# Patient Record
Sex: FEMALE | Race: ASIAN | NOT HISPANIC OR LATINO
[De-identification: names, ages, dates, MRNs, and addresses within clinical notes are randomized per-mention and may not be internally consistent; named-entity substitution may affect disease eponyms.]

---

## 2017-01-10 ENCOUNTER — APPOINTMENT (OUTPATIENT)
Dept: PSYCHIATRY | Facility: CLINIC | Age: 57
End: 2017-01-10

## 2017-02-14 ENCOUNTER — RX RENEWAL (OUTPATIENT)
Age: 57
End: 2017-02-14

## 2017-04-28 ENCOUNTER — APPOINTMENT (OUTPATIENT)
Dept: PSYCHIATRY | Facility: CLINIC | Age: 57
End: 2017-04-28

## 2017-07-06 ENCOUNTER — APPOINTMENT (OUTPATIENT)
Dept: PSYCHIATRY | Facility: CLINIC | Age: 57
End: 2017-07-06

## 2017-11-08 ENCOUNTER — APPOINTMENT (OUTPATIENT)
Dept: PSYCHIATRY | Facility: CLINIC | Age: 57
End: 2017-11-08
Payer: COMMERCIAL

## 2017-11-08 PROCEDURE — 99214 OFFICE O/P EST MOD 30 MIN: CPT

## 2017-11-28 ENCOUNTER — EMERGENCY (EMERGENCY)
Facility: HOSPITAL | Age: 57
LOS: 1 days | Discharge: HOME | End: 2017-11-28
Attending: EMERGENCY MEDICINE | Admitting: EMERGENCY MEDICINE
Payer: COMMERCIAL

## 2017-11-28 VITALS
DIASTOLIC BLOOD PRESSURE: 84 MMHG | OXYGEN SATURATION: 98 % | RESPIRATION RATE: 18 BRPM | HEART RATE: 74 BPM | TEMPERATURE: 98 F | SYSTOLIC BLOOD PRESSURE: 157 MMHG

## 2017-11-28 DIAGNOSIS — Z98.89 OTHER SPECIFIED POSTPROCEDURAL STATES: Chronic | ICD-10-CM

## 2017-11-28 PROCEDURE — 93010 ELECTROCARDIOGRAM REPORT: CPT

## 2017-11-28 PROCEDURE — 99285 EMERGENCY DEPT VISIT HI MDM: CPT | Mod: 25

## 2017-11-28 NOTE — ED ADULT NURSE NOTE - CHPI ED SYMPTOMS NEG
no chills/no diaphoresis/no vomiting/no nausea/no syncope/no back pain/no shortness of breath/no fever

## 2017-11-28 NOTE — ED ADULT NURSE NOTE - OBJECTIVE STATEMENT
57 year old female came into the ER via ambulation with c/o palpitations for 3 days.  Pt states she also had an episode of slurred speech at work and has history of TIA in the past. 57 year old female came into the ER via ambulation with c/o palpitations and cough for 3 days.  Pt states she also had an episode of slurred speech at work and has history of TIA in the past. Gross neuro intact, PERRL. No c/o dizziness, SOB, N/V/D, fever or chills. Family at bedside, comfort and safety maintained.

## 2017-11-28 NOTE — ED ADULT TRIAGE NOTE - CHIEF COMPLAINT QUOTE
pt reports palpitations x multiples days. cough/weakness. "I feel like somethings not right in my body"

## 2017-11-29 VITALS
HEART RATE: 77 BPM | RESPIRATION RATE: 18 BRPM | DIASTOLIC BLOOD PRESSURE: 64 MMHG | SYSTOLIC BLOOD PRESSURE: 99 MMHG | TEMPERATURE: 98 F | OXYGEN SATURATION: 98 %

## 2017-11-29 LAB
ALBUMIN SERPL ELPH-MCNC: 4.6 G/DL — SIGNIFICANT CHANGE UP (ref 3.3–5)
ALP SERPL-CCNC: 47 U/L — SIGNIFICANT CHANGE UP (ref 40–120)
ALT FLD-CCNC: 21 U/L RC — SIGNIFICANT CHANGE UP (ref 10–45)
ANION GAP SERPL CALC-SCNC: 13 MMOL/L — SIGNIFICANT CHANGE UP (ref 5–17)
APPEARANCE UR: CLEAR — SIGNIFICANT CHANGE UP
AST SERPL-CCNC: 20 U/L — SIGNIFICANT CHANGE UP (ref 10–40)
BASOPHILS # BLD AUTO: 0 K/UL — SIGNIFICANT CHANGE UP (ref 0–0.2)
BASOPHILS NFR BLD AUTO: 0.1 % — SIGNIFICANT CHANGE UP (ref 0–2)
BILIRUB SERPL-MCNC: 0.4 MG/DL — SIGNIFICANT CHANGE UP (ref 0.2–1.2)
BILIRUB UR-MCNC: NEGATIVE — SIGNIFICANT CHANGE UP
BUN SERPL-MCNC: 16 MG/DL — SIGNIFICANT CHANGE UP (ref 7–23)
CALCIUM SERPL-MCNC: 9.3 MG/DL — SIGNIFICANT CHANGE UP (ref 8.4–10.5)
CHLORIDE SERPL-SCNC: 103 MMOL/L — SIGNIFICANT CHANGE UP (ref 96–108)
CO2 SERPL-SCNC: 27 MMOL/L — SIGNIFICANT CHANGE UP (ref 22–31)
COLOR SPEC: COLORLESS — SIGNIFICANT CHANGE UP
CREAT SERPL-MCNC: 0.87 MG/DL — SIGNIFICANT CHANGE UP (ref 0.5–1.3)
DIFF PNL FLD: NEGATIVE — SIGNIFICANT CHANGE UP
EOSINOPHIL # BLD AUTO: 0.2 K/UL — SIGNIFICANT CHANGE UP (ref 0–0.5)
EOSINOPHIL NFR BLD AUTO: 3 % — SIGNIFICANT CHANGE UP (ref 0–6)
EPI CELLS # UR: SIGNIFICANT CHANGE UP /HPF
GLUCOSE SERPL-MCNC: 151 MG/DL — HIGH (ref 70–99)
GLUCOSE UR QL: NEGATIVE — SIGNIFICANT CHANGE UP
HCT VFR BLD CALC: 31.6 % — LOW (ref 34.5–45)
HGB BLD-MCNC: 10.4 G/DL — LOW (ref 11.5–15.5)
KETONES UR-MCNC: NEGATIVE — SIGNIFICANT CHANGE UP
LEUKOCYTE ESTERASE UR-ACNC: ABNORMAL
LYMPHOCYTES # BLD AUTO: 2.4 K/UL — SIGNIFICANT CHANGE UP (ref 1–3.3)
LYMPHOCYTES # BLD AUTO: 36.8 % — SIGNIFICANT CHANGE UP (ref 13–44)
MCHC RBC-ENTMCNC: 20.9 PG — LOW (ref 27–34)
MCHC RBC-ENTMCNC: 32.9 GM/DL — SIGNIFICANT CHANGE UP (ref 32–36)
MCV RBC AUTO: 63.4 FL — LOW (ref 80–100)
MONOCYTES # BLD AUTO: 0.4 K/UL — SIGNIFICANT CHANGE UP (ref 0–0.9)
MONOCYTES NFR BLD AUTO: 6.2 % — SIGNIFICANT CHANGE UP (ref 2–14)
NEUTROPHILS # BLD AUTO: 3.5 K/UL — SIGNIFICANT CHANGE UP (ref 1.8–7.4)
NEUTROPHILS NFR BLD AUTO: 54 % — SIGNIFICANT CHANGE UP (ref 43–77)
NITRITE UR-MCNC: NEGATIVE — SIGNIFICANT CHANGE UP
PH UR: 6.5 — SIGNIFICANT CHANGE UP (ref 5–8)
PLATELET # BLD AUTO: 206 K/UL — SIGNIFICANT CHANGE UP (ref 150–400)
POTASSIUM SERPL-MCNC: 3.6 MMOL/L — SIGNIFICANT CHANGE UP (ref 3.5–5.3)
POTASSIUM SERPL-SCNC: 3.6 MMOL/L — SIGNIFICANT CHANGE UP (ref 3.5–5.3)
PROT SERPL-MCNC: 7.4 G/DL — SIGNIFICANT CHANGE UP (ref 6–8.3)
PROT UR-MCNC: NEGATIVE — SIGNIFICANT CHANGE UP
RBC # BLD: 4.98 M/UL — SIGNIFICANT CHANGE UP (ref 3.8–5.2)
RBC # FLD: 14.7 % — HIGH (ref 10.3–14.5)
RBC CASTS # UR COMP ASSIST: SIGNIFICANT CHANGE UP /HPF (ref 0–2)
SODIUM SERPL-SCNC: 143 MMOL/L — SIGNIFICANT CHANGE UP (ref 135–145)
SP GR SPEC: 1 — LOW (ref 1.01–1.02)
TSH SERPL-MCNC: 0.73 UIU/ML — SIGNIFICANT CHANGE UP (ref 0.27–4.2)
UROBILINOGEN FLD QL: NEGATIVE — SIGNIFICANT CHANGE UP
WBC # BLD: 6.5 K/UL — SIGNIFICANT CHANGE UP (ref 3.8–10.5)
WBC # FLD AUTO: 6.5 K/UL — SIGNIFICANT CHANGE UP (ref 3.8–10.5)
WBC UR QL: SIGNIFICANT CHANGE UP /HPF (ref 0–5)

## 2017-11-29 PROCEDURE — 71020: CPT | Mod: 26

## 2017-11-29 PROCEDURE — 84443 ASSAY THYROID STIM HORMONE: CPT

## 2017-11-29 PROCEDURE — 81001 URINALYSIS AUTO W/SCOPE: CPT

## 2017-11-29 PROCEDURE — 93005 ELECTROCARDIOGRAM TRACING: CPT

## 2017-11-29 PROCEDURE — 80053 COMPREHEN METABOLIC PANEL: CPT

## 2017-11-29 PROCEDURE — 84484 ASSAY OF TROPONIN QUANT: CPT

## 2017-11-29 PROCEDURE — 71046 X-RAY EXAM CHEST 2 VIEWS: CPT

## 2017-11-29 PROCEDURE — 99283 EMERGENCY DEPT VISIT LOW MDM: CPT | Mod: 25

## 2017-11-29 PROCEDURE — 85027 COMPLETE CBC AUTOMATED: CPT

## 2017-11-29 NOTE — ED PROVIDER NOTE - MEDICAL DECISION MAKING DETAILS
Geovanna Stewart MD - Attending Physician: Pt with multiple medical complaints, acute on chronic, followed by her DrSallie Here with palpitations, chest pain, ?slurred speech earlier that is resolved. Labs, Xr, obs

## 2017-11-29 NOTE — ED PROVIDER NOTE - PROGRESS NOTE DETAILS
Pt with normal labs, normal CXR, EKG unchanged from prior. Atypical story, subacute in onset, not concerned for ACS. Pt needs to f/u with pcp and her psychiatrist (pt reports whom thinks her chronic symptoms are related to anxiety/depression). Discussed return precautions

## 2017-11-29 NOTE — ED PROVIDER NOTE - OBJECTIVE STATEMENT
Pt reports palpitations x weeks. Intermittent, reports current symptoms. She notes sometimes gets chest pain, various places, today had pain to mid chest. Not associated with SOB, nausea, vomiting or dizziness. She reports she called her doctor and they increased her Lexapro today, but she still did not feel well so came here. She reports feeling like "my gait is changed" but is unable to clarify what she means by this. She says at work today she had an episode of slurred speech that lasted a second and resolved. Unsure if anyone witnessed it. Denies fevers.     Takes Synthroid, Lexapro, and Risperdol

## 2017-12-06 ENCOUNTER — APPOINTMENT (OUTPATIENT)
Dept: PSYCHIATRY | Facility: CLINIC | Age: 57
End: 2017-12-06
Payer: COMMERCIAL

## 2017-12-06 PROCEDURE — 99214 OFFICE O/P EST MOD 30 MIN: CPT

## 2018-01-08 ENCOUNTER — APPOINTMENT (OUTPATIENT)
Dept: PSYCHIATRY | Facility: CLINIC | Age: 58
End: 2018-01-08
Payer: COMMERCIAL

## 2018-01-08 PROCEDURE — 99214 OFFICE O/P EST MOD 30 MIN: CPT

## 2018-01-22 ENCOUNTER — RESULT REVIEW (OUTPATIENT)
Age: 58
End: 2018-01-22

## 2018-04-16 ENCOUNTER — APPOINTMENT (OUTPATIENT)
Dept: PSYCHIATRY | Facility: CLINIC | Age: 58
End: 2018-04-16
Payer: COMMERCIAL

## 2018-04-16 PROCEDURE — 99214 OFFICE O/P EST MOD 30 MIN: CPT

## 2018-07-10 ENCOUNTER — RX RENEWAL (OUTPATIENT)
Age: 58
End: 2018-07-10

## 2018-08-07 ENCOUNTER — APPOINTMENT (OUTPATIENT)
Dept: PSYCHIATRY | Facility: CLINIC | Age: 58
End: 2018-08-07
Payer: COMMERCIAL

## 2018-08-07 PROCEDURE — 99214 OFFICE O/P EST MOD 30 MIN: CPT

## 2018-11-12 ENCOUNTER — APPOINTMENT (OUTPATIENT)
Dept: PSYCHIATRY | Facility: CLINIC | Age: 58
End: 2018-11-12
Payer: COMMERCIAL

## 2018-11-12 PROCEDURE — 99214 OFFICE O/P EST MOD 30 MIN: CPT

## 2019-02-12 ENCOUNTER — APPOINTMENT (OUTPATIENT)
Dept: PSYCHIATRY | Facility: CLINIC | Age: 59
End: 2019-02-12
Payer: COMMERCIAL

## 2019-02-12 PROCEDURE — 99214 OFFICE O/P EST MOD 30 MIN: CPT

## 2019-05-17 ENCOUNTER — APPOINTMENT (OUTPATIENT)
Dept: PSYCHIATRY | Facility: CLINIC | Age: 59
End: 2019-05-17

## 2019-05-17 ENCOUNTER — RX RENEWAL (OUTPATIENT)
Age: 59
End: 2019-05-17

## 2019-06-10 ENCOUNTER — APPOINTMENT (OUTPATIENT)
Dept: PSYCHIATRY | Facility: CLINIC | Age: 59
End: 2019-06-10
Payer: COMMERCIAL

## 2019-06-10 PROCEDURE — 99214 OFFICE O/P EST MOD 30 MIN: CPT

## 2019-07-29 ENCOUNTER — APPOINTMENT (OUTPATIENT)
Dept: PSYCHIATRY | Facility: CLINIC | Age: 59
End: 2019-07-29
Payer: COMMERCIAL

## 2019-07-29 PROCEDURE — 99214 OFFICE O/P EST MOD 30 MIN: CPT

## 2019-09-10 ENCOUNTER — APPOINTMENT (OUTPATIENT)
Dept: PSYCHIATRY | Facility: CLINIC | Age: 59
End: 2019-09-10
Payer: COMMERCIAL

## 2019-09-10 PROCEDURE — 99214 OFFICE O/P EST MOD 30 MIN: CPT

## 2019-11-19 ENCOUNTER — APPOINTMENT (OUTPATIENT)
Dept: PSYCHIATRY | Facility: CLINIC | Age: 59
End: 2019-11-19
Payer: COMMERCIAL

## 2019-11-19 PROCEDURE — 99214 OFFICE O/P EST MOD 30 MIN: CPT

## 2019-11-19 RX ORDER — MULTIVIT-MIN/FOLIC/VIT K/LYCOP 400-300MCG
50 MCG TABLET ORAL
Refills: 0 | Status: ACTIVE | COMMUNITY
Start: 2019-11-19

## 2020-02-28 ENCOUNTER — APPOINTMENT (OUTPATIENT)
Dept: PSYCHIATRY | Facility: CLINIC | Age: 60
End: 2020-02-28
Payer: COMMERCIAL

## 2020-02-28 PROCEDURE — 99214 OFFICE O/P EST MOD 30 MIN: CPT

## 2020-05-12 ENCOUNTER — APPOINTMENT (OUTPATIENT)
Dept: PSYCHIATRY | Facility: CLINIC | Age: 60
End: 2020-05-12

## 2020-06-01 ENCOUNTER — APPOINTMENT (OUTPATIENT)
Dept: PSYCHIATRY | Facility: CLINIC | Age: 60
End: 2020-06-01
Payer: COMMERCIAL

## 2020-06-01 PROCEDURE — 99214 OFFICE O/P EST MOD 30 MIN: CPT

## 2020-09-21 ENCOUNTER — APPOINTMENT (OUTPATIENT)
Dept: PSYCHIATRY | Facility: CLINIC | Age: 60
End: 2020-09-21
Payer: COMMERCIAL

## 2020-09-21 PROCEDURE — 99214 OFFICE O/P EST MOD 30 MIN: CPT

## 2021-02-25 ENCOUNTER — EMERGENCY (EMERGENCY)
Facility: HOSPITAL | Age: 61
LOS: 1 days | Discharge: HOME | End: 2021-02-25
Attending: STUDENT IN AN ORGANIZED HEALTH CARE EDUCATION/TRAINING PROGRAM
Payer: MEDICARE

## 2021-02-25 VITALS
SYSTOLIC BLOOD PRESSURE: 138 MMHG | WEIGHT: 169.98 LBS | HEART RATE: 74 BPM | OXYGEN SATURATION: 98 % | DIASTOLIC BLOOD PRESSURE: 64 MMHG | TEMPERATURE: 98 F | RESPIRATION RATE: 20 BRPM

## 2021-02-25 VITALS
OXYGEN SATURATION: 99 % | DIASTOLIC BLOOD PRESSURE: 67 MMHG | SYSTOLIC BLOOD PRESSURE: 106 MMHG | TEMPERATURE: 98 F | HEART RATE: 67 BPM | RESPIRATION RATE: 16 BRPM

## 2021-02-25 DIAGNOSIS — Z98.89 OTHER SPECIFIED POSTPROCEDURAL STATES: Chronic | ICD-10-CM

## 2021-02-25 LAB
ALBUMIN SERPL ELPH-MCNC: 4 G/DL — SIGNIFICANT CHANGE UP (ref 3.3–5)
ALP SERPL-CCNC: 60 U/L — SIGNIFICANT CHANGE UP (ref 40–120)
ALT FLD-CCNC: 16 U/L — SIGNIFICANT CHANGE UP (ref 10–45)
ANION GAP SERPL CALC-SCNC: 13 MMOL/L — SIGNIFICANT CHANGE UP (ref 5–17)
APPEARANCE UR: CLEAR — SIGNIFICANT CHANGE UP
AST SERPL-CCNC: 20 U/L — SIGNIFICANT CHANGE UP (ref 10–40)
BACTERIA # UR AUTO: NEGATIVE — SIGNIFICANT CHANGE UP
BASOPHILS # BLD AUTO: 0 K/UL — SIGNIFICANT CHANGE UP (ref 0–0.2)
BASOPHILS NFR BLD AUTO: 0 % — SIGNIFICANT CHANGE UP (ref 0–2)
BILIRUB SERPL-MCNC: 0.2 MG/DL — SIGNIFICANT CHANGE UP (ref 0.2–1.2)
BILIRUB UR-MCNC: NEGATIVE — SIGNIFICANT CHANGE UP
BUN SERPL-MCNC: 13 MG/DL — SIGNIFICANT CHANGE UP (ref 7–23)
CALCIUM SERPL-MCNC: 9.8 MG/DL — SIGNIFICANT CHANGE UP (ref 8.4–10.5)
CHLORIDE SERPL-SCNC: 99 MMOL/L — SIGNIFICANT CHANGE UP (ref 96–108)
CK SERPL-CCNC: 74 U/L — SIGNIFICANT CHANGE UP (ref 25–170)
CO2 SERPL-SCNC: 25 MMOL/L — SIGNIFICANT CHANGE UP (ref 22–31)
COLOR SPEC: SIGNIFICANT CHANGE UP
CREAT SERPL-MCNC: 0.7 MG/DL — SIGNIFICANT CHANGE UP (ref 0.5–1.3)
CRP SERPL-MCNC: 11.2 MG/DL — HIGH (ref 0–0.4)
DIFF PNL FLD: NEGATIVE — SIGNIFICANT CHANGE UP
EOSINOPHIL # BLD AUTO: 1.48 K/UL — HIGH (ref 0–0.5)
EOSINOPHIL NFR BLD AUTO: 13.9 % — HIGH (ref 0–6)
EPI CELLS # UR: 1 /HPF — SIGNIFICANT CHANGE UP
ERYTHROCYTE [SEDIMENTATION RATE] IN BLOOD: 109 MM/HR — HIGH (ref 0–20)
GLUCOSE SERPL-MCNC: 101 MG/DL — HIGH (ref 70–99)
GLUCOSE UR QL: NEGATIVE — SIGNIFICANT CHANGE UP
HCT VFR BLD CALC: 29.6 % — LOW (ref 34.5–45)
HGB BLD-MCNC: 9.3 G/DL — LOW (ref 11.5–15.5)
HYALINE CASTS # UR AUTO: 0 /LPF — SIGNIFICANT CHANGE UP (ref 0–2)
KETONES UR-MCNC: NEGATIVE — SIGNIFICANT CHANGE UP
LEUKOCYTE ESTERASE UR-ACNC: ABNORMAL
LYMPHOCYTES # BLD AUTO: 1.76 K/UL — SIGNIFICANT CHANGE UP (ref 1–3.3)
LYMPHOCYTES # BLD AUTO: 16.5 % — SIGNIFICANT CHANGE UP (ref 13–44)
MCHC RBC-ENTMCNC: 19.3 PG — LOW (ref 27–34)
MCHC RBC-ENTMCNC: 31.4 GM/DL — LOW (ref 32–36)
MCV RBC AUTO: 61.4 FL — LOW (ref 80–100)
MONOCYTES # BLD AUTO: 0.28 K/UL — SIGNIFICANT CHANGE UP (ref 0–0.9)
MONOCYTES NFR BLD AUTO: 2.6 % — SIGNIFICANT CHANGE UP (ref 2–14)
NEUTROPHILS # BLD AUTO: 7.15 K/UL — SIGNIFICANT CHANGE UP (ref 1.8–7.4)
NEUTROPHILS NFR BLD AUTO: 67 % — SIGNIFICANT CHANGE UP (ref 43–77)
NITRITE UR-MCNC: NEGATIVE — SIGNIFICANT CHANGE UP
PH UR: 7 — SIGNIFICANT CHANGE UP (ref 5–8)
PLATELET # BLD AUTO: 601 K/UL — HIGH (ref 150–400)
POTASSIUM SERPL-MCNC: 3.5 MMOL/L — SIGNIFICANT CHANGE UP (ref 3.5–5.3)
POTASSIUM SERPL-SCNC: 3.5 MMOL/L — SIGNIFICANT CHANGE UP (ref 3.5–5.3)
PROCALCITONIN SERPL-MCNC: 0.04 NG/ML — SIGNIFICANT CHANGE UP (ref 0.02–0.1)
PROT SERPL-MCNC: 7.5 G/DL — SIGNIFICANT CHANGE UP (ref 6–8.3)
PROT UR-MCNC: NEGATIVE — SIGNIFICANT CHANGE UP
RBC # BLD: 4.82 M/UL — SIGNIFICANT CHANGE UP (ref 3.8–5.2)
RBC # FLD: 15.6 % — HIGH (ref 10.3–14.5)
RBC CASTS # UR COMP ASSIST: 1 /HPF — SIGNIFICANT CHANGE UP (ref 0–4)
RHEUMATOID FACT SERPL-ACNC: 11 IU/ML — SIGNIFICANT CHANGE UP (ref 0–13)
SARS-COV-2 RNA SPEC QL NAA+PROBE: SIGNIFICANT CHANGE UP
SODIUM SERPL-SCNC: 137 MMOL/L — SIGNIFICANT CHANGE UP (ref 135–145)
SP GR SPEC: 1.01 — LOW (ref 1.01–1.02)
UROBILINOGEN FLD QL: NEGATIVE — SIGNIFICANT CHANGE UP
WBC # BLD: 10.67 K/UL — HIGH (ref 3.8–10.5)
WBC # FLD AUTO: 10.67 K/UL — HIGH (ref 3.8–10.5)
WBC UR QL: 7 /HPF — HIGH (ref 0–5)

## 2021-02-25 PROCEDURE — 86162 COMPLEMENT TOTAL (CH50): CPT

## 2021-02-25 PROCEDURE — 99285 EMERGENCY DEPT VISIT HI MDM: CPT

## 2021-02-25 PROCEDURE — 71045 X-RAY EXAM CHEST 1 VIEW: CPT | Mod: 26

## 2021-02-25 PROCEDURE — 80053 COMPREHEN METABOLIC PANEL: CPT

## 2021-02-25 PROCEDURE — 86160 COMPLEMENT ANTIGEN: CPT

## 2021-02-25 PROCEDURE — 99284 EMERGENCY DEPT VISIT MOD MDM: CPT | Mod: 25

## 2021-02-25 PROCEDURE — 93970 EXTREMITY STUDY: CPT

## 2021-02-25 PROCEDURE — 71045 X-RAY EXAM CHEST 1 VIEW: CPT

## 2021-02-25 PROCEDURE — 85652 RBC SED RATE AUTOMATED: CPT

## 2021-02-25 PROCEDURE — 84145 PROCALCITONIN (PCT): CPT

## 2021-02-25 PROCEDURE — 83520 IMMUNOASSAY QUANT NOS NONAB: CPT

## 2021-02-25 PROCEDURE — 87040 BLOOD CULTURE FOR BACTERIA: CPT

## 2021-02-25 PROCEDURE — 85025 COMPLETE CBC W/AUTO DIFF WBC: CPT

## 2021-02-25 PROCEDURE — 86431 RHEUMATOID FACTOR QUANT: CPT

## 2021-02-25 PROCEDURE — 82550 ASSAY OF CK (CPK): CPT

## 2021-02-25 PROCEDURE — 86769 SARS-COV-2 COVID-19 ANTIBODY: CPT

## 2021-02-25 PROCEDURE — 86140 C-REACTIVE PROTEIN: CPT

## 2021-02-25 PROCEDURE — 87086 URINE CULTURE/COLONY COUNT: CPT

## 2021-02-25 PROCEDURE — U0003: CPT

## 2021-02-25 PROCEDURE — U0005: CPT

## 2021-02-25 PROCEDURE — 93970 EXTREMITY STUDY: CPT | Mod: 26

## 2021-02-25 PROCEDURE — 81001 URINALYSIS AUTO W/SCOPE: CPT

## 2021-02-25 PROCEDURE — 93005 ELECTROCARDIOGRAM TRACING: CPT

## 2021-02-25 RX ORDER — IBUPROFEN 200 MG
600 TABLET ORAL ONCE
Refills: 0 | Status: COMPLETED | OUTPATIENT
Start: 2021-02-25 | End: 2021-02-25

## 2021-02-25 RX ADMIN — Medication 600 MILLIGRAM(S): at 18:18

## 2021-02-25 RX ADMIN — Medication 600 MILLIGRAM(S): at 11:43

## 2021-02-25 NOTE — ED PROVIDER NOTE - NS ED ROS FT
Gen: + fevers, decreased appetite   Eyes: No eye irritation or discharge  ENT: No ear pain, congestion, sore throat  Resp: No cough or trouble breathing  Cardiovascular: No chest pain or palpitation  Gastroenteric: No nausea/vomiting, diarrhea, constipation  :  No change in urine output; no dysuria  MS: + diffuse jt swelling, myalgias   Skin: No rashes  Neuro: No headache; no abnormal movements  Remainder negative, except as per the HPI

## 2021-02-25 NOTE — ED PROVIDER NOTE - PHYSICAL EXAMINATION
A&Ox3, NAD. NCAT. PERRL, EOMI. Neck supple, no LAD. Lungs CTAB. +S1S2, RRR, No m/r/g. Abd soft, NT/ND, +BS, no rebound or guarding. Extremities: cap refill <2, pulses in distal extremities 4+, no edema. Skin without rash. CN II-XII intact. Strength 5/5 UE/LE. no joint ttp throughout hands, elbows, knees, shoulders. hands appear swollen, no peripheral edema BL. Sensations intact throughout. Gait steady.

## 2021-02-25 NOTE — ED ADULT TRIAGE NOTE - CHIEF COMPLAINT QUOTE
s/p COVID vaccine 3 wks ago. Symptoms started fever, hand swelling, leg pain, difficulty walking. 101.4 last night. Took tylenol at 6AM today

## 2021-02-25 NOTE — ED PROVIDER NOTE - CARE PLAN
Principal Discharge DX:	Myalgia   Principal Discharge DX:	Myalgia  Secondary Diagnosis:	Fever of unknown origin  Secondary Diagnosis:	Joint swelling  Secondary Diagnosis:	Leg swelling

## 2021-02-25 NOTE — ED PROVIDER NOTE - PROGRESS NOTE DETAILS
spoke with rheum attending Dr. Graham, recommended ibuprofen for myalgia and will see her within a week. -Ember Max PA-C

## 2021-02-25 NOTE — ED PROVIDER NOTE - NSFOLLOWUPINSTRUCTIONS_ED_ALL_ED_FT
- stay hydrated.   - take tylenol 975mg and ibuprofen 600mg every 6 hours as needed for pain-take with meals.  - follow up with your pcp in 1-2 days.  - follow up with Dr. Graham this week, call number attached to make an appointment  - return if symptoms worsen, weakness, numbness/tingling, blurred vision, difficulty ambulating and all other concerns.

## 2021-02-25 NOTE — ED PROVIDER NOTE - CARE PROVIDER_API CALL
Connie Graham)  Internal Medicine; Rheumatology  [address] [facility] [address] [facility] [address]  [place] [address]  Phone: [phone]  Fax: [phone]  Follow Up Time: 1-3 Days

## 2021-02-25 NOTE — ED ADULT NURSE NOTE - OBJECTIVE STATEMENT
60 year old female patient presents ambulatory to ED c/o joint pain and persistent fevers x 3 weeks s/p second pfizer vaccine. Patient states she has been having daily fevers of 100.4F-101.4F over the past few weeks and is reporting b/l knee, elbow and shoulder pain. Patient states she called her PMD and was told to take tylenol for fevers but has not been able to see his doctor. Patient reports some relief with tyelnol but pain returns shortly after. Patient denies current CP, fevers, SOB, abd pain, n/v/d, urinary symptoms. Patient last took tylenol at 6am, but found to be febrile to 100.5F rectally. MD aware. Patient aware of plan of care.

## 2021-02-25 NOTE — ED PROVIDER NOTE - OBJECTIVE STATEMENT
61 yo female with pmh hypothyroidism, depression, presenting with concern of myalgias, fevers, and weakness x 3 weeks after receiving 2nd Pfizer covid vaccination. Pt states she has had fevers daily Tmax 101.4, myalgia, hand, arm and leg swelling. Denies headaches, nausea, vomiting, abdominal pina, rashes, dysuria, cough, congestion, shortness of breath, difficulty breathing, no recent travel, tick/insect bites.

## 2021-02-25 NOTE — ED PROVIDER NOTE - PATIENT PORTAL LINK FT
You can access the FollowMyHealth Patient Portal offered by [facility] [place] by registering at the following website: [email or web address]. By joining [facility]’s FollowMyHealth portal, you will also be able to view your health information using other applications (apps) compatible with our system.

## 2021-02-25 NOTE — ED PROVIDER NOTE - CLINICAL SUMMARY MEDICAL DECISION MAKING FREE TEXT BOX
Jordan DO: 60F hx hypothyroidism, depression, presenting w/ c/o myalgias, joint swelling, joint pains, and low grade temps 100.4-101F x 3 weeks after receiving her second dose of pfizer vaccine. Denies chest pain, sob, throat pain/ throat swelling, itching, travel, sick contacts, wt loss, night sweats. Does sweat after using antipyretics. Tolerating po. Denies abd pain/ n/v. Never developed a rash. Vitals stable, febrile, very well appearing, heart s1s2 no murmurs, lungs cta b/l, abd soft ntnd, b/l LE symmetrical- nonpitting edema b/l mild, mild joint swelling b/l hands, no erythema of joint, no severe ttp, ranges all joints with normal range of motion. Pt reports she feels 'stiffness' of the joints. Plan: consider immune reaction to vaccine, does not appear to be DRESS, poss serum sickness? but very well appearing, likely can tolerate po steroids if warranted. Jordan DO: 60F hx hypothyroidism, depression, presenting w/ c/o myalgias, joint swelling, joint pains, and low grade temps 100.4-101F x 3 weeks after receiving her second dose of pfizer vaccine. Denies chest pain, sob, throat pain/ throat swelling, itching, travel, sick contacts, wt loss, night sweats. Does sweat after using antipyretics. Tolerating po. Denies abd pain/ n/v. Never developed a rash. Vitals stable, febrile, very well appearing, heart s1s2 no murmurs, lungs cta b/l, abd soft ntnd, b/l LE symmetrical- nonpitting edema b/l mild, mild joint swelling b/l hands, no erythema of joint, no severe ttp, ranges all joints with normal range of motion but Pt reports she feels 'stiffness' of the joints. Plan: polyarthralgia and fevers of unknown source- consider immune reaction to vaccine, does not appear to be DRESS, poss serum sickness? vs stills disease but very well appearing, likely can tolerate po steroids if warranted, will rule out infectious etiology and discuss w/ rheumatology regarding possible outpt follow up and if any medications should be initiated. will send complement factors to facilitate dx. rule out dvt though less likely but pt reports significant b/l LE swelling.

## 2021-02-26 ENCOUNTER — APPOINTMENT (OUTPATIENT)
Dept: RHEUMATOLOGY | Facility: CLINIC | Age: 61
End: 2021-02-26

## 2021-02-26 ENCOUNTER — APPOINTMENT (OUTPATIENT)
Dept: RHEUMATOLOGY | Facility: CLINIC | Age: 61
End: 2021-02-26
Payer: COMMERCIAL

## 2021-02-26 LAB
C3 SERPL-MCNC: 190 MG/DL — HIGH (ref 81–157)
C4 SERPL-MCNC: 17 MG/DL — SIGNIFICANT CHANGE UP (ref 13–39)
CULTURE RESULTS: SIGNIFICANT CHANGE UP
SPECIMEN SOURCE: SIGNIFICANT CHANGE UP

## 2021-02-26 PROCEDURE — 99204 OFFICE O/P NEW MOD 45 MIN: CPT | Mod: 95

## 2021-02-27 ENCOUNTER — NON-APPOINTMENT (OUTPATIENT)
Age: 61
End: 2021-02-27

## 2021-02-27 NOTE — ED POST DISCHARGE NOTE - DETAILS
Pt to follow up with Rheum this week after the Covid vaccine. IL 2, 10, 17 and 6 elevated in setting of recent COVID vaccine. Pt followed up w/ rheum Dr. Graham on 2/26 (note in HIE, results pending at that time), sent call out to Dr. Graham to discuss results, awaiting return call. Pt has another rheum f/u on 3/26 scheduled. - Brandon Martin PA-C

## 2021-02-27 NOTE — ED POST DISCHARGE NOTE - ADDITIONAL DOCUMENTATION
Pt seen in ED for myalgias and fevers n after receiving Covid vaccine. As above pt followed up wit Rheum 2/26 and had additional follow up 3/26 and is being managed as an outpt w/ medications started per documentation. No call back from pt Rheum at this time, contacting pt would not  as she has already initiated a relationship with her Rheumatologist who will be further managing her care. Will clear results.  -Olga Sykes PA-C

## 2021-02-28 LAB
A-TUMOR NECROSIS FACT SERPL-MCNC: 2.3 PG/ML — SIGNIFICANT CHANGE UP
IL10 SERPL-MCNC: 10.5 PG/ML — HIGH
IL12 SERPL-MCNC: <1.9 PG/ML — SIGNIFICANT CHANGE UP
IL13 SERPL-MCNC: <1.7 PG/ML — SIGNIFICANT CHANGE UP
IL17A SERPL-MCNC: 5.6 PG/ML — HIGH
IL2 SERPL-MCNC: 992.6 PG/ML — HIGH (ref 175.3–858.2)
IL2 SERPL-MCNC: <2.1 PG/ML — SIGNIFICANT CHANGE UP
IL4 SERPL-MCNC: <2.2 PG/ML — SIGNIFICANT CHANGE UP
IL6 SERPL-MCNC: 15.5 PG/ML — HIGH
IL8 SERPL-MCNC: <3 PG/ML — SIGNIFICANT CHANGE UP
INTERFERON GAMMA, BLOOD: <4.2 PG/ML — SIGNIFICANT CHANGE UP
INTERLEUKIN 1 BETA: <6.5 PG/ML — SIGNIFICANT CHANGE UP
INTERLEUKIN 5: <2.1 PG/ML — SIGNIFICANT CHANGE UP
SARS-COV-2 IGG SERPL QL IA: POSITIVE
SARS-COV-2 IGM SERPL IA-ACNC: 2.21 INDEX — HIGH

## 2021-03-01 LAB — TOTAL HEM COMP BLD-ACNC: 55 U/ML — SIGNIFICANT CHANGE UP (ref 42–95)

## 2021-03-01 NOTE — HISTORY OF PRESENT ILLNESS
[FreeTextEntry1] : Rosalio used.\par \par = Ms. Kemp is a 60F presenting with fevers, joint and muscle pain that started several hours after she received the 2nd dose of COVID vaccine (Pfizer), which was 3 weeks ago\par = After the first dose she had fevers for 2 days and the symptoms resolved\par = after the second dose she has developed fevers up to T101 with significant swelling in her feet, knees, ankles, shoulders, elbows and hands. Her hands have been swollen and she has had difficulty making a fist. She still gets daily fevers up to 101\par = She was in bed for 5 days due to fevers and malaise. \par = She recovered partially and went back to work but the fevers recurred\par = she has been taking ibuprofen 400mg on occasion and it has been helping a little bit.\par = she went to [facility] ED yesterday 2/25 for the fevers. Had normal CBC, CMP, UA, CXR, Doppler LE. , CRP 10, blood cx x 2 neg, urine culture neg. She was given ibuprofen 600mg which helped and was dc home. Higher doses of ibuprofen give her diarrhea.\par = she has felt a little bit better past 2 days\par = prior to the vaccine she denies a h/o joint pain or swelling\par = no recent sick contacts or travel\par = in 3/2020 she had COVID with symptoms of fevers, cough, but no arthritis\par = ROS: no rash, oral/nasal ulcers, hair loss, appetite is ok, no significant weight loss; no chest pain, GI complaints\par \par FH: No personal or family history of autoimmune disease, psoriasis, IBD.\par Occupation: nursing supervisor\par PMH: hypothyroidism, major depressive disorder with psychotic features

## 2021-03-01 NOTE — ASSESSMENT
[FreeTextEntry1] : 60F with COVID infection in 3/2020 that developed severe inflammatory arthritis and fevers after her 2nd COVID vaccine. 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 complements and cytokine panel pending from hospital\par -meloxicam 7.5mg bid with PPI for now. Prednisone 40mg makes her dizzy.\par -RTC in 1 week

## 2021-03-01 NOTE — PHYSICAL EXAM
[FreeTextEntry1] : prominent swelling of right 2nd and 3rd mcp, slight swelling of 4th-5th MCP, slighht swelling 1-5th MCP left hand.

## 2021-03-02 ENCOUNTER — APPOINTMENT (OUTPATIENT)
Dept: PSYCHIATRY | Facility: CLINIC | Age: 61
End: 2021-03-02
Payer: COMMERCIAL

## 2021-03-02 LAB
C5 SERPL-MCNC: 21.6 MG/DL — SIGNIFICANT CHANGE UP (ref 10.6–26.3)
C6 SERPL-MCNC: 61 U/ML — HIGH (ref 32–57)
C7 SERPL-MCNC: 48 U/ML — SIGNIFICANT CHANGE UP (ref 36–60)
C8 SERPL-MCNC: 58 U/ML — SIGNIFICANT CHANGE UP (ref 33–58)
C9 SERPL-MCNC: 62 U/ML — HIGH (ref 37–61)
CULTURE RESULTS: SIGNIFICANT CHANGE UP
CULTURE RESULTS: SIGNIFICANT CHANGE UP
SPECIMEN SOURCE: SIGNIFICANT CHANGE UP
SPECIMEN SOURCE: SIGNIFICANT CHANGE UP

## 2021-03-02 PROCEDURE — 99214 OFFICE O/P EST MOD 30 MIN: CPT

## 2021-03-02 PROCEDURE — 99072 ADDL SUPL MATRL&STAF TM PHE: CPT

## 2021-03-04 ENCOUNTER — APPOINTMENT (OUTPATIENT)
Dept: RHEUMATOLOGY | Facility: CLINIC | Age: 61
End: 2021-03-04
Payer: COMMERCIAL

## 2021-03-04 ENCOUNTER — LABORATORY RESULT (OUTPATIENT)
Age: 61
End: 2021-03-04

## 2021-03-04 VITALS
WEIGHT: 170 LBS | SYSTOLIC BLOOD PRESSURE: 125 MMHG | OXYGEN SATURATION: 96 % | TEMPERATURE: 98 F | HEART RATE: 81 BPM | BODY MASS INDEX: 32.1 KG/M2 | HEIGHT: 61 IN | RESPIRATION RATE: 16 BRPM | DIASTOLIC BLOOD PRESSURE: 82 MMHG

## 2021-03-04 PROCEDURE — 99214 OFFICE O/P EST MOD 30 MIN: CPT

## 2021-03-04 PROCEDURE — 99072 ADDL SUPL MATRL&STAF TM PHE: CPT

## 2021-03-07 LAB
25(OH)D3 SERPL-MCNC: 44.8 NG/ML
A PHAGOCYTOPH IGG TITR SER IF: NORMAL TITER
ALBUMIN MFR SERPL ELPH: 47.4 %
ALBUMIN SERPL ELPH-MCNC: 3.8 G/DL
ALBUMIN SERPL-MCNC: 3.4 G/DL
ALBUMIN/GLOB SERPL: 0.9 RATIO
ALP BLD-CCNC: 63 U/L
ALPHA1 GLOB MFR SERPL ELPH: 7.3 %
ALPHA1 GLOB SERPL ELPH-MCNC: 0.5 G/DL
ALPHA2 GLOB MFR SERPL ELPH: 13 %
ALPHA2 GLOB SERPL ELPH-MCNC: 0.9 G/DL
ALT SERPL-CCNC: 16 U/L
ANA PAT FLD IF-IMP: ABNORMAL
ANA PAT FLD IF-IMP: ABNORMAL
ANA SER IF-ACNC: ABNORMAL
ANA SER IF-ACNC: ABNORMAL
ANION GAP SERPL CALC-SCNC: 15 MMOL/L
APPEARANCE: CLEAR
AST SERPL-CCNC: 23 U/L
B BURGDOR AB SER QL IA: NEGATIVE
B BURGDOR AB SER-IMP: NEGATIVE
B BURGDOR IGM PATRN SER IB-IMP: NEGATIVE
B BURGDOR18KD IGG SER QL IB: NORMAL
B BURGDOR23KD IGG SER QL IB: NORMAL
B BURGDOR23KD IGM SER QL IB: NORMAL
B BURGDOR28KD IGG SER QL IB: NORMAL
B BURGDOR30KD IGG SER QL IB: NORMAL
B BURGDOR31KD IGG SER QL IB: NORMAL
B BURGDOR39KD IGG SER QL IB: NORMAL
B BURGDOR39KD IGM SER QL IB: NORMAL
B BURGDOR41KD IGG SER QL IB: NORMAL
B BURGDOR41KD IGM SER QL IB: NORMAL
B BURGDOR45KD IGG SER QL IB: NORMAL
B BURGDOR58KD IGG SER QL IB: PRESENT
B BURGDOR66KD IGG SER QL IB: NORMAL
B BURGDOR93KD IGG SER QL IB: NORMAL
B MICROTI IGG TITR SER: NORMAL TITER
B-GLOBULIN MFR SERPL ELPH: 12.7 %
B-GLOBULIN SERPL ELPH-MCNC: 0.9 G/DL
BACTERIA: NEGATIVE
BASOPHILS # BLD AUTO: 0.02 K/UL
BASOPHILS NFR BLD AUTO: 0.2 %
BILIRUB SERPL-MCNC: 0.2 MG/DL
BILIRUBIN URINE: NEGATIVE
BLOOD URINE: NEGATIVE
BUN SERPL-MCNC: 11 MG/DL
C3 SERPL-MCNC: 184 MG/DL
C4 SERPL-MCNC: 16 MG/DL
CALCIUM SERPL-MCNC: 9 MG/DL
CCP AB SER IA-ACNC: 12 UNITS
CENTROMERE IGG SER-ACNC: <0.2 CD:130001892
CH50 SERPL-MCNC: 30 U/ML
CH50 SERPL-MCNC: 51 U/ML
CHLORIDE SERPL-SCNC: 100 MMOL/L
CHOLEST SERPL-MCNC: 142 MG/DL
CK SERPL-CCNC: 58 U/L
CO2 SERPL-SCNC: 22 MMOL/L
COLOR: YELLOW
CREAT SERPL-MCNC: 0.78 MG/DL
CREAT SPEC-SCNC: 83 MG/DL
CREAT/PROT UR: 0.2 RATIO
DEPRECATED KAPPA LC FREE/LAMBDA SER: 1.63 RATIO
DEPRECATED KAPPA LC FREE/LAMBDA SER: 1.77 RATIO
DSDNA AB SER-ACNC: 128 IU/ML
DSDNA AB SER-ACNC: 173 IU/ML
E CHAFFEENSIS IGG TITR SER IF: NORMAL TITER
ENA RNP AB SER IA-ACNC: 0.2 AL
ENA SCL70 IGG SER IA-ACNC: <0.2 AL
ENA SM AB SER IA-ACNC: <0.2 AL
ENA SS-A AB SER IA-ACNC: <0.2 AL
ENA SS-B AB SER IA-ACNC: 0.2 AL
EOSINOPHIL # BLD AUTO: 0.83 K/UL
EOSINOPHIL NFR BLD AUTO: 7.5 %
FERRITIN SERPL-MCNC: 566 NG/ML
GAMMA GLOB FLD ELPH-MCNC: 1.4 G/DL
GAMMA GLOB MFR SERPL ELPH: 19.6 %
GLUCOSE QUALITATIVE U: NEGATIVE
GLUCOSE SERPL-MCNC: 103 MG/DL
HBV CORE IGG+IGM SER QL: NONREACTIVE
HBV SURFACE AB SER QL: REACTIVE
HBV SURFACE AG SER QL: NONREACTIVE
HCT VFR BLD CALC: 30.1 %
HCV AB SER QL: NONREACTIVE
HCV S/CO RATIO: 0.16 S/CO
HDLC SERPL-MCNC: 43 MG/DL
HGB BLD-MCNC: 8.9 G/DL
HIV1+2 AB SPEC QL IA.RAPID: NONREACTIVE
HYALINE CASTS: 0 /LPF
IGA SER QL IEP: 149 MG/DL
IGG SER QL IEP: 1408 MG/DL
IGM SER QL IEP: 63 MG/DL
IMM GRANULOCYTES NFR BLD AUTO: 0.6 %
INTERPRETATION SERPL IEP-IMP: NORMAL
KAPPA LC CSF-MCNC: 3.23 MG/DL
KAPPA LC CSF-MCNC: 3.66 MG/DL
KAPPA LC SERPL-MCNC: 5.71 MG/DL
KAPPA LC SERPL-MCNC: 5.98 MG/DL
KETONES URINE: NEGATIVE
LDH SERPL-CCNC: 163 U/L
LDLC SERPL CALC-MCNC: 66 MG/DL
LEUKOCYTE ESTERASE URINE: NEGATIVE
LYMPHOCYTES # BLD AUTO: 2.85 K/UL
LYMPHOCYTES NFR BLD AUTO: 25.9 %
M PROTEIN SPEC IFE-MCNC: NORMAL
M TB IFN-G BLD-IMP: NEGATIVE
MAN DIFF?: NORMAL
MCHC RBC-ENTMCNC: 18.7 PG
MCHC RBC-ENTMCNC: 29.6 GM/DL
MCV RBC AUTO: 63.4 FL
MICROSCOPIC-UA: NORMAL
MONOCYTES # BLD AUTO: 0.73 K/UL
MONOCYTES NFR BLD AUTO: 6.6 %
MPO AB + PR3 PNL SER: NORMAL
N GONORRHOEA RRNA SPEC QL NAA+PROBE: NOT DETECTED
NEUTROPHILS # BLD AUTO: 6.51 K/UL
NEUTROPHILS NFR BLD AUTO: 59.2 %
NITRITE URINE: NEGATIVE
NONHDLC SERPL-MCNC: 99 MG/DL
PH URINE: 6.5
PLATELET # BLD AUTO: 639 K/UL
POTASSIUM SERPL-SCNC: 4.5 MMOL/L
PROT SERPL-MCNC: 7.2 G/DL
PROT UR-MCNC: 16 MG/DL
PROTEIN URINE: NEGATIVE
QUANTIFERON TB PLUS MITOGEN MINUS NIL: 8.03 IU/ML
QUANTIFERON TB PLUS NIL: 0.05 IU/ML
QUANTIFERON TB PLUS TB1 MINUS NIL: -0.01 IU/ML
QUANTIFERON TB PLUS TB2 MINUS NIL: -0.01 IU/ML
RBC # BLD: 4.75 M/UL
RBC # FLD: 15.8 %
RED BLOOD CELLS URINE: 1 /HPF
RF+CCP IGG SER-IMP: NEGATIVE
SARS-COV-2 IGG SERPL IA-ACNC: 51.9 INDEX
SARS-COV-2 IGG SERPL QL IA: POSITIVE
SODIUM SERPL-SCNC: 136 MMOL/L
SOURCE AMPLIFICATION: NORMAL
SPECIFIC GRAVITY URINE: 1.01
SQUAMOUS EPITHELIAL CELLS: 2 /HPF
TRIGL SERPL-MCNC: 160 MG/DL
UROBILINOGEN URINE: NORMAL
WBC # FLD AUTO: 11.01 K/UL
WHITE BLOOD CELLS URINE: 1 /HPF

## 2021-03-08 ENCOUNTER — TRANSCRIPTION ENCOUNTER (OUTPATIENT)
Age: 61
End: 2021-03-08

## 2021-03-09 ENCOUNTER — NON-APPOINTMENT (OUTPATIENT)
Age: 61
End: 2021-03-09

## 2021-03-10 LAB
ALBUPE: 8.7 %
ALPHA1UPE: 52.3 %
ALPHA2UPE: 24.5 %
BETAUPE: 6.8 %
CREAT 24H UR-MCNC: NORMAL G/24 H
CREATININE UR (MAYO): 82 MG/DL
G6PD SER-CCNC: 28.5 U/G HGB
GAMMAUPE: 7.7 %
IGA 24H UR QL IFE: NORMAL
KAPPA LC 24H UR QL: NORMAL
PROT PATTERN 24H UR ELPH-IMP: NORMAL
PROT UR-MCNC: 14 MG/DL
PROT UR-MCNC: 14 MG/DL
SPECIMEN VOL 24H UR: NORMAL ML

## 2021-03-15 ENCOUNTER — NON-APPOINTMENT (OUTPATIENT)
Age: 61
End: 2021-03-15

## 2021-03-26 ENCOUNTER — APPOINTMENT (OUTPATIENT)
Dept: RHEUMATOLOGY | Facility: CLINIC | Age: 61
End: 2021-03-26

## 2021-04-02 ENCOUNTER — APPOINTMENT (OUTPATIENT)
Dept: RHEUMATOLOGY | Facility: CLINIC | Age: 61
End: 2021-04-02
Payer: COMMERCIAL

## 2021-04-02 VITALS
WEIGHT: 173 LBS | BODY MASS INDEX: 32.66 KG/M2 | SYSTOLIC BLOOD PRESSURE: 137 MMHG | TEMPERATURE: 97.2 F | HEIGHT: 61 IN | OXYGEN SATURATION: 98 % | HEART RATE: 82 BPM | DIASTOLIC BLOOD PRESSURE: 80 MMHG | RESPIRATION RATE: 16 BRPM

## 2021-04-02 PROCEDURE — 99072 ADDL SUPL MATRL&STAF TM PHE: CPT

## 2021-04-02 PROCEDURE — 99214 OFFICE O/P EST MOD 30 MIN: CPT

## 2021-04-09 LAB
APPEARANCE: CLEAR
BACTERIA: NEGATIVE
BILIRUBIN URINE: NEGATIVE
BLOOD URINE: NEGATIVE
C3 SERPL-MCNC: 137 MG/DL
C4 SERPL-MCNC: 17 MG/DL
COLOR: NORMAL
CREAT SPEC-SCNC: 22 MG/DL
CREAT/PROT UR: 0.2 RATIO
CRP SERPL-MCNC: 6 MG/L
DEPRECATED KAPPA LC FREE/LAMBDA SER: 1.96 RATIO
DSDNA AB SER-ACNC: 148 IU/ML
GLUCOSE QUALITATIVE U: NEGATIVE
HAPTOGLOB SERPL-MCNC: 167 MG/DL
HYALINE CASTS: 0 /LPF
IRON SATN MFR SERPL: 19 %
IRON SERPL-MCNC: 60 UG/DL
KAPPA LC CSF-MCNC: 2.43 MG/DL
KAPPA LC SERPL-MCNC: 4.77 MG/DL
KETONES URINE: NEGATIVE
LDH SERPL-CCNC: 180 U/L
LEUKOCYTE ESTERASE URINE: NEGATIVE
MICROSCOPIC-UA: NORMAL
NITRITE URINE: NEGATIVE
PH URINE: 6
PROT UR-MCNC: 4 MG/DL
PROTEIN URINE: NEGATIVE
RBC # BLD: 5.06 M/UL
RED BLOOD CELLS URINE: 1 /HPF
RETICS # AUTO: 2 %
RETICS AGGREG/RBC NFR: 100.2 K/UL
SPECIFIC GRAVITY URINE: 1.01
SQUAMOUS EPITHELIAL CELLS: 0 /HPF
T4 FREE SERPL-MCNC: 1.2 NG/DL
T4 SERPL-MCNC: 6.6 UG/DL
TIBC SERPL-MCNC: 320 UG/DL
TSH SERPL-ACNC: 12.7 UIU/ML
UIBC SERPL-MCNC: 260 UG/DL
UROBILINOGEN URINE: NORMAL
VIT B12 SERPL-MCNC: 1015 PG/ML
WHITE BLOOD CELLS URINE: 4 /HPF

## 2021-04-23 ENCOUNTER — APPOINTMENT (OUTPATIENT)
Dept: ORTHOPEDIC SURGERY | Facility: CLINIC | Age: 61
End: 2021-04-23
Payer: COMMERCIAL

## 2021-04-23 VITALS
SYSTOLIC BLOOD PRESSURE: 158 MMHG | BODY MASS INDEX: 32.1 KG/M2 | DIASTOLIC BLOOD PRESSURE: 82 MMHG | HEART RATE: 67 BPM | HEIGHT: 61 IN | OXYGEN SATURATION: 95 % | WEIGHT: 170 LBS

## 2021-04-23 DIAGNOSIS — M25.571 PAIN IN RIGHT ANKLE AND JOINTS OF RIGHT FOOT: ICD-10-CM

## 2021-04-23 DIAGNOSIS — Z82.49 FAMILY HISTORY OF ISCHEMIC HEART DISEASE AND OTHER DISEASES OF THE CIRCULATORY SYSTEM: ICD-10-CM

## 2021-04-23 PROCEDURE — 73610 X-RAY EXAM OF ANKLE: CPT | Mod: RT

## 2021-04-23 PROCEDURE — 99072 ADDL SUPL MATRL&STAF TM PHE: CPT

## 2021-04-23 PROCEDURE — 99203 OFFICE O/P NEW LOW 30 MIN: CPT

## 2021-04-23 NOTE — PHYSICAL EXAM
[de-identified] : Physical examination of the patient's right lower extremity discloses mild tenderness on palpation to the medial malleolar region.  There is lateral soft tissue mass consistent with a chronic bursa overlying the lateral malleolar region.  Significant bunion deformity is present [de-identified] : X-rays taken of the right ankle in AP lateral and internal oblique projections disclose mild tibiotalar joint space thinning otherwise nonspecific

## 2021-04-23 NOTE — DISCUSSION/SUMMARY
[de-identified] : The patient was advised that she has separate issues related to her foot and ankle namely mild arthritis, lateral cystic mass, and a severe bunion deformity.  She was advised on seeking definitive care with a foot and ankle specialist.  She was given the name of Dr. Dwayne Burton.

## 2021-04-23 NOTE — HISTORY OF PRESENT ILLNESS
[Sneakers] : agnes [FreeTextEntry1] : Pt presents for initial evaluation with pain in her right ankle. Pt states she was wearing shoes that she thinks,  hurt her ankle appox 1 month ago. Pt has taken Advil with relief. Pt has applied lidocaine cream and tiger balm.  Pt has no hx of gout. Pt is seeing a foot doctor for bunion care on her right foot.\par There is swelling noted to the lateral right ankle. Pt states she had Pfizer vaccine and developed swelling to her knees she was seen by rheumatology Connie Graham MD rxd prednisone.

## 2021-04-28 ENCOUNTER — APPOINTMENT (OUTPATIENT)
Dept: PSYCHIATRY | Facility: CLINIC | Age: 61
End: 2021-04-28
Payer: COMMERCIAL

## 2021-04-28 ENCOUNTER — APPOINTMENT (OUTPATIENT)
Dept: PSYCHIATRY | Facility: CLINIC | Age: 61
End: 2021-04-28

## 2021-04-28 PROCEDURE — 99072 ADDL SUPL MATRL&STAF TM PHE: CPT

## 2021-04-28 PROCEDURE — 99214 OFFICE O/P EST MOD 30 MIN: CPT

## 2021-04-28 RX ORDER — PREDNISONE 5 MG/1
5 TABLET ORAL
Qty: 90 | Refills: 0 | Status: DISCONTINUED | COMMUNITY
Start: 2021-04-02 | End: 2021-04-28

## 2021-04-28 RX ORDER — PREDNISONE 5 MG/1
5 TABLET ORAL
Qty: 150 | Refills: 0 | Status: DISCONTINUED | COMMUNITY
Start: 2021-03-04 | End: 2021-04-28

## 2021-04-28 RX ORDER — HYDROXYCHLOROQUINE SULFATE 200 MG/1
200 TABLET, FILM COATED ORAL TWICE DAILY
Qty: 60 | Refills: 3 | Status: DISCONTINUED | COMMUNITY
Start: 2021-04-02 | End: 2021-04-28

## 2021-04-28 RX ORDER — ESOMEPRAZOLE MAGNESIUM 40 MG/1
40 CAPSULE, DELAYED RELEASE ORAL DAILY
Qty: 30 | Refills: 3 | Status: DISCONTINUED | COMMUNITY
Start: 2021-02-26 | End: 2021-04-28

## 2021-05-01 ENCOUNTER — APPOINTMENT (OUTPATIENT)
Dept: ORTHOPEDIC SURGERY | Facility: CLINIC | Age: 61
End: 2021-05-01
Payer: COMMERCIAL

## 2021-05-01 VITALS
HEART RATE: 65 BPM | SYSTOLIC BLOOD PRESSURE: 153 MMHG | DIASTOLIC BLOOD PRESSURE: 84 MMHG | HEIGHT: 61 IN | WEIGHT: 170 LBS | BODY MASS INDEX: 32.1 KG/M2

## 2021-05-01 DIAGNOSIS — M06.4 INFLAMMATORY POLYARTHROPATHY: ICD-10-CM

## 2021-05-01 DIAGNOSIS — M20.11 HALLUX VALGUS (ACQUIRED), RIGHT FOOT: ICD-10-CM

## 2021-05-01 DIAGNOSIS — M21.42 FLAT FOOT [PES PLANUS] (ACQUIRED), RIGHT FOOT: ICD-10-CM

## 2021-05-01 DIAGNOSIS — M76.821 POSTERIOR TIBIAL TENDINITIS, RIGHT LEG: ICD-10-CM

## 2021-05-01 DIAGNOSIS — M62.89 OTHER SPECIFIED DISORDERS OF MUSCLE: ICD-10-CM

## 2021-05-01 DIAGNOSIS — M21.41 FLAT FOOT [PES PLANUS] (ACQUIRED), RIGHT FOOT: ICD-10-CM

## 2021-05-01 PROCEDURE — 73600 X-RAY EXAM OF ANKLE: CPT | Mod: RT

## 2021-05-01 PROCEDURE — 99072 ADDL SUPL MATRL&STAF TM PHE: CPT

## 2021-05-01 PROCEDURE — 99214 OFFICE O/P EST MOD 30 MIN: CPT

## 2021-05-01 PROCEDURE — 73630 X-RAY EXAM OF FOOT: CPT | Mod: RT

## 2021-05-05 PROBLEM — M21.41 ACQUIRED PES PLANUS OF BOTH FEET: Status: ACTIVE | Noted: 2021-05-01

## 2021-05-05 PROBLEM — M20.11 HALLUX VALGUS OF RIGHT FOOT: Status: ACTIVE | Noted: 2021-05-05

## 2021-05-05 PROBLEM — M06.4 POLYARTHRITIS, INFLAMMATORY: Status: ACTIVE | Noted: 2021-02-26

## 2021-05-05 PROBLEM — M62.89 TIGHTNESS OF BOTH GASTROCNEMIUS MUSCLES: Status: ACTIVE | Noted: 2021-05-05

## 2021-05-05 PROBLEM — M76.821 POSTERIOR TIBIAL TENDON DYSFUNCTION, RIGHT: Status: ACTIVE | Noted: 2021-05-01

## 2021-05-05 NOTE — DISCUSSION/SUMMARY
[de-identified] : Discussed with patient nature of condition, potential course / sequelae, options reviewed.  NB shoe wear, [place] brace custom orthotic, activity modification, HEP.  Educational handout provided.  Return to office in 6 - 8 weeks / PRN, all questions answered.

## 2021-05-05 NOTE — PHYSICAL EXAM
[de-identified] : Extremity: +Equinus (releases) R LE, +PPAV R foot, soft tissue swelling and associated tenderness PTT insertional R foot, residual weakness with R SLHR testing, hallux valgus R forefoot.  Nontender R ankle, peroneals, syndesmosis, Achilles, hindfoot ST, midfoot LF and forefoot.  Stable Drawer testing R ankle, 5 / 5 evertor strength R ankle, calves soft and nontender, sensorimotor unchanged, skin intact B LE.  AOx3, mood / affect normal. [de-identified] : Radiographs (3v R foot and 2v R ankle) reveal PTS R foot, accessory navicular R midfoot, plantar calcaneal enthesophyte R hindfoot, hallux valgus R forefoot.

## 2021-05-05 NOTE — HISTORY OF PRESENT ILLNESS
[Other: ____] : [unfilled] [FreeTextEntry1] : Reports R pes planus with associated medial-sided pain R ankle / hindfoot PTTD for approximately 2 months, and also states bunion right foot.  Denies antecedent trauma nor additional musculoskeletal complaints referable to foot / ankle.

## 2021-05-11 ENCOUNTER — RX RENEWAL (OUTPATIENT)
Age: 61
End: 2021-05-11

## 2021-06-01 ENCOUNTER — INPATIENT (INPATIENT)
Facility: HOSPITAL | Age: 61
LOS: 2 days | Discharge: HOME | DRG: 293 | End: 2021-06-04
Attending: INTERNAL MEDICINE | Admitting: HOSPITALIST
Payer: COMMERCIAL

## 2021-06-01 VITALS
OXYGEN SATURATION: 96 % | HEART RATE: 64 BPM | WEIGHT: 167.99 LBS | HEIGHT: 60 IN | SYSTOLIC BLOOD PRESSURE: 125 MMHG | RESPIRATION RATE: 16 BRPM | DIASTOLIC BLOOD PRESSURE: 70 MMHG | TEMPERATURE: 98 F

## 2021-06-01 DIAGNOSIS — Z98.89 OTHER SPECIFIED POSTPROCEDURAL STATES: Chronic | ICD-10-CM

## 2021-06-01 LAB
ALBUMIN SERPL ELPH-MCNC: 4.2 G/DL — SIGNIFICANT CHANGE UP (ref 3.3–5)
ALP SERPL-CCNC: 50 U/L — SIGNIFICANT CHANGE UP (ref 40–120)
ALT FLD-CCNC: 13 U/L — SIGNIFICANT CHANGE UP (ref 10–45)
ANION GAP SERPL CALC-SCNC: 12 MMOL/L — SIGNIFICANT CHANGE UP (ref 5–17)
ANISOCYTOSIS BLD QL: SLIGHT — SIGNIFICANT CHANGE UP
AST SERPL-CCNC: 17 U/L — SIGNIFICANT CHANGE UP (ref 10–40)
BASOPHILS # BLD AUTO: 0 K/UL — SIGNIFICANT CHANGE UP (ref 0–0.2)
BASOPHILS NFR BLD AUTO: 0 % — SIGNIFICANT CHANGE UP (ref 0–2)
BILIRUB SERPL-MCNC: 0.2 MG/DL — SIGNIFICANT CHANGE UP (ref 0.2–1.2)
BUN SERPL-MCNC: 10 MG/DL — SIGNIFICANT CHANGE UP (ref 7–23)
BURR CELLS BLD QL SMEAR: PRESENT — SIGNIFICANT CHANGE UP
CALCIUM SERPL-MCNC: 9.5 MG/DL — SIGNIFICANT CHANGE UP (ref 8.4–10.5)
CHLORIDE SERPL-SCNC: 105 MMOL/L — SIGNIFICANT CHANGE UP (ref 96–108)
CO2 SERPL-SCNC: 23 MMOL/L — SIGNIFICANT CHANGE UP (ref 22–31)
CREAT SERPL-MCNC: 0.73 MG/DL — SIGNIFICANT CHANGE UP (ref 0.5–1.3)
DACRYOCYTES BLD QL SMEAR: SLIGHT — SIGNIFICANT CHANGE UP
ELLIPTOCYTES BLD QL SMEAR: SLIGHT — SIGNIFICANT CHANGE UP
EOSINOPHIL # BLD AUTO: 0.35 K/UL — SIGNIFICANT CHANGE UP (ref 0–0.5)
EOSINOPHIL NFR BLD AUTO: 6.1 % — HIGH (ref 0–6)
GLUCOSE SERPL-MCNC: 109 MG/DL — HIGH (ref 70–99)
HCT VFR BLD CALC: 34.3 % — LOW (ref 34.5–45)
HGB BLD-MCNC: 10.3 G/DL — LOW (ref 11.5–15.5)
HYPOCHROMIA BLD QL: SLIGHT — SIGNIFICANT CHANGE UP
LYMPHOCYTES # BLD AUTO: 2.69 K/UL — SIGNIFICANT CHANGE UP (ref 1–3.3)
LYMPHOCYTES # BLD AUTO: 47 % — HIGH (ref 13–44)
MAGNESIUM SERPL-MCNC: 2 MG/DL — SIGNIFICANT CHANGE UP (ref 1.6–2.6)
MANUAL SMEAR VERIFICATION: SIGNIFICANT CHANGE UP
MCHC RBC-ENTMCNC: 19 PG — LOW (ref 27–34)
MCHC RBC-ENTMCNC: 30 GM/DL — LOW (ref 32–36)
MCV RBC AUTO: 63.3 FL — LOW (ref 80–100)
MICROCYTES BLD QL: SIGNIFICANT CHANGE UP
MONOCYTES # BLD AUTO: 0.35 K/UL — SIGNIFICANT CHANGE UP (ref 0–0.9)
MONOCYTES NFR BLD AUTO: 6.1 % — SIGNIFICANT CHANGE UP (ref 2–14)
NEUTROPHILS # BLD AUTO: 2.24 K/UL — SIGNIFICANT CHANGE UP (ref 1.8–7.4)
NEUTROPHILS NFR BLD AUTO: 39.1 % — LOW (ref 43–77)
NT-PROBNP SERPL-SCNC: 41 PG/ML — SIGNIFICANT CHANGE UP (ref 0–300)
OVALOCYTES BLD QL SMEAR: SIGNIFICANT CHANGE UP
PLAT MORPH BLD: NORMAL — SIGNIFICANT CHANGE UP
PLATELET # BLD AUTO: 404 K/UL — HIGH (ref 150–400)
POIKILOCYTOSIS BLD QL AUTO: SIGNIFICANT CHANGE UP
POLYCHROMASIA BLD QL SMEAR: SLIGHT — SIGNIFICANT CHANGE UP
POTASSIUM SERPL-MCNC: 3.9 MMOL/L — SIGNIFICANT CHANGE UP (ref 3.5–5.3)
POTASSIUM SERPL-SCNC: 3.9 MMOL/L — SIGNIFICANT CHANGE UP (ref 3.5–5.3)
PROT SERPL-MCNC: 7.7 G/DL — SIGNIFICANT CHANGE UP (ref 6–8.3)
RBC # BLD: 5.42 M/UL — HIGH (ref 3.8–5.2)
RBC # FLD: 17.9 % — HIGH (ref 10.3–14.5)
RBC BLD AUTO: ABNORMAL
SCHISTOCYTES BLD QL AUTO: SIGNIFICANT CHANGE UP
SODIUM SERPL-SCNC: 140 MMOL/L — SIGNIFICANT CHANGE UP (ref 135–145)
TARGETS BLD QL SMEAR: SLIGHT — SIGNIFICANT CHANGE UP
TROPONIN T, HIGH SENSITIVITY RESULT: 6 NG/L — SIGNIFICANT CHANGE UP (ref 0–51)
VARIANT LYMPHS # BLD: 1.7 % — SIGNIFICANT CHANGE UP (ref 0–6)
WBC # BLD: 5.72 K/UL — SIGNIFICANT CHANGE UP (ref 3.8–10.5)
WBC # FLD AUTO: 5.72 K/UL — SIGNIFICANT CHANGE UP (ref 3.8–10.5)

## 2021-06-01 PROCEDURE — 93010 ELECTROCARDIOGRAM REPORT: CPT

## 2021-06-01 PROCEDURE — 99285 EMERGENCY DEPT VISIT HI MDM: CPT

## 2021-06-01 NOTE — ED PROVIDER NOTE - RAPID ASSESSMENT
61y F with pmhx of hypothyroid intermittent generalized chest pressure and SOB x2 weeks. Sx worse with walking and improved with rest. Endorses mild leg swelling. No hx of lung problems. States she had a stress test a year ago.       Patient was seen as a tele QDOC patient. The patient will be seen and further worked up in the main emergency department and their care will be completed by the main emergency department team along with a thorough physical exam. Receiving team will follow up on labs, analgesia, any clinical imaging, reassess and disposition as clinically indicated, all decisions regarding the progression of care will be made at their discretion.    Scribe Statement: Cathryn NAPOLES, attest that this documentation has been prepared under the direction and in the presence of Ivette Sebastian)

## 2021-06-01 NOTE — ED PROVIDER NOTE - CLINICAL SUMMARY MEDICAL DECISION MAKING FREE TEXT BOX
62 y/o F with hx hypothyroidism presenting to the ED c/o CP and SOB, worse with exertion. Vitals stable. No significant exam findings. Given exertional symptoms, will r/o ACS and also send d-dimer in setting of CP and SOB. If negative work up, likely CDU for stress/echo. Alistair Quevedo, DO PGY2

## 2021-06-01 NOTE — ED ADULT NURSE REASSESSMENT NOTE - NS ED NURSE REASSESS COMMENT FT1
Pt seen by QDOC RN in triage owens. IV inserted, labs drawn and sent. Pt evaluation and treatment to be continued by ED team.

## 2021-06-01 NOTE — ED PROVIDER NOTE - NS ED ROS FT
CONST: no fevers, no chills  EYES: no pain, no vision changes  ENT: no sore throat, no ear pain, no change in hearing  CV: + chest pain, + leg swelling  RESP: + shortness of breath, no cough  ABD: no abdominal pain, no nausea, no vomiting, no diarrhea  : no dysuria, no flank pain, no hematuria  MSK: no back pain, no extremity pain  NEURO: no headache or additional neurologic complaints  HEME: no easy bleeding  SKIN:  no rash

## 2021-06-01 NOTE — ED PROVIDER NOTE - ATTENDING CONTRIBUTION TO CARE
Pt presents w several weeks of GONZALEZ and pressure over chest with exertion, tells me she is asymptomatic at rest and denies any actual progression of symptoms recently. endorses mild edema of both her legs but denies pain. on exam, pt is well appearing, breathing comfortably, trace pitting edema b/l, no calf tenderness, symmetrical. ekg shows TWI laterally but are stable from prior. trop neg. low risk for PE so will send DD. Pt will be placed in CDU for stress echo to r/o cad leading to angina or chf. Pt presents w several weeks of GONZALEZ and pressure over chest with exertion, tells me she is asymptomatic at rest and denies any actual progression of symptoms recently. endorses mild edema of both her legs but denies pain. on exam, pt is well appearing, breathing comfortably, trace pitting edema b/l, no calf tenderness, symmetrical. ekg shows TWI laterally but are stable from prior. trop neg. low risk for PE so will send DD.    DD elevated. CTPE showed no PE but did show significant b/l pleural effusions. given this and her symptoms, pt will be admitted for further w/u of these effusions and her dyspnea.

## 2021-06-01 NOTE — ED ADULT NURSE NOTE - OBJECTIVE STATEMENT
Patient is a 61 year old female complaining of chest pressure and SOB. Patient has history of hypothyroidism, anxiety. Patient is A&O x 4. Pt reports sob and chest pressure x 1 month. pt states SOB is worst when ambulating. pt has edema to B/l lower extremities. pt states she had a allergic reaction to peanuts a month ago. Denies complaints of fevers, chills, n/v/d, burning urination, blood in urine, blood in stool. Abd is soft, non tender, non distended. Safety and comfort maintained. Will continue to monitor.

## 2021-06-01 NOTE — ED PROVIDER NOTE - OBJECTIVE STATEMENT
62 y/o F with PMH hypothyroidism presenting to the ED c/o chest pressure and SOB x 2 weeks. States symptoms are worse with exertion and improve at rest. Endorses mild swelling in b/l legs. Denies fever, chills, nausea, or vomiting. No cough. No hx of blood clots. No family hx heart disease.

## 2021-06-02 DIAGNOSIS — J81.1 CHRONIC PULMONARY EDEMA: ICD-10-CM

## 2021-06-02 LAB
D DIMER BLD IA.RAPID-MCNC: 800 NG/ML DDU — HIGH
SARS-COV-2 RNA SPEC QL NAA+PROBE: SIGNIFICANT CHANGE UP
TROPONIN T, HIGH SENSITIVITY RESULT: <6 NG/L — SIGNIFICANT CHANGE UP (ref 0–51)

## 2021-06-02 PROCEDURE — 71045 X-RAY EXAM CHEST 1 VIEW: CPT | Mod: 26

## 2021-06-02 PROCEDURE — 71275 CT ANGIOGRAPHY CHEST: CPT | Mod: 26,MA

## 2021-06-02 RX ORDER — LEVOTHYROXINE SODIUM 125 MCG
100 TABLET ORAL DAILY
Refills: 0 | Status: DISCONTINUED | OUTPATIENT
Start: 2021-06-02 | End: 2021-06-04

## 2021-06-02 RX ORDER — RISPERIDONE 4 MG/1
1 TABLET ORAL DAILY
Refills: 0 | Status: DISCONTINUED | OUTPATIENT
Start: 2021-06-02 | End: 2021-06-04

## 2021-06-02 RX ORDER — ESCITALOPRAM OXALATE 10 MG/1
10 TABLET, FILM COATED ORAL DAILY
Refills: 0 | Status: DISCONTINUED | OUTPATIENT
Start: 2021-06-02 | End: 2021-06-02

## 2021-06-02 RX ORDER — CHOLECALCIFEROL (VITAMIN D3) 125 MCG
1000 CAPSULE ORAL DAILY
Refills: 0 | Status: DISCONTINUED | OUTPATIENT
Start: 2021-06-02 | End: 2021-06-04

## 2021-06-02 RX ORDER — FENOFIBRATE,MICRONIZED 130 MG
145 CAPSULE ORAL DAILY
Refills: 0 | Status: DISCONTINUED | OUTPATIENT
Start: 2021-06-02 | End: 2021-06-04

## 2021-06-02 RX ORDER — PREGABALIN 225 MG/1
1000 CAPSULE ORAL DAILY
Refills: 0 | Status: DISCONTINUED | OUTPATIENT
Start: 2021-06-02 | End: 2021-06-04

## 2021-06-02 RX ORDER — ESCITALOPRAM OXALATE 10 MG/1
20 TABLET, FILM COATED ORAL DAILY
Refills: 0 | Status: DISCONTINUED | OUTPATIENT
Start: 2021-06-02 | End: 2021-06-04

## 2021-06-02 RX ORDER — ASPIRIN/CALCIUM CARB/MAGNESIUM 324 MG
81 TABLET ORAL DAILY
Refills: 0 | Status: DISCONTINUED | OUTPATIENT
Start: 2021-06-02 | End: 2021-06-04

## 2021-06-02 RX ORDER — FERROUS SULFATE 325(65) MG
325 TABLET ORAL DAILY
Refills: 0 | Status: DISCONTINUED | OUTPATIENT
Start: 2021-06-02 | End: 2021-06-02

## 2021-06-02 RX ORDER — ESCITALOPRAM OXALATE 10 MG/1
10 TABLET, FILM COATED ORAL AT BEDTIME
Refills: 0 | Status: DISCONTINUED | OUTPATIENT
Start: 2021-06-02 | End: 2021-06-04

## 2021-06-02 RX ORDER — FUROSEMIDE 40 MG
40 TABLET ORAL ONCE
Refills: 0 | Status: COMPLETED | OUTPATIENT
Start: 2021-06-02 | End: 2021-06-02

## 2021-06-02 RX ORDER — HEPARIN SODIUM 5000 [USP'U]/ML
5000 INJECTION INTRAVENOUS; SUBCUTANEOUS EVERY 8 HOURS
Refills: 0 | Status: DISCONTINUED | OUTPATIENT
Start: 2021-06-02 | End: 2021-06-04

## 2021-06-02 RX ORDER — LEVOTHYROXINE SODIUM 125 MCG
125 TABLET ORAL DAILY
Refills: 0 | Status: DISCONTINUED | OUTPATIENT
Start: 2021-06-02 | End: 2021-06-02

## 2021-06-02 RX ADMIN — RISPERIDONE 1 MILLIGRAM(S): 4 TABLET ORAL at 23:15

## 2021-06-02 RX ADMIN — Medication 145 MILLIGRAM(S): at 23:12

## 2021-06-02 RX ADMIN — HEPARIN SODIUM 5000 UNIT(S): 5000 INJECTION INTRAVENOUS; SUBCUTANEOUS at 21:47

## 2021-06-02 RX ADMIN — ESCITALOPRAM OXALATE 10 MILLIGRAM(S): 10 TABLET, FILM COATED ORAL at 21:47

## 2021-06-02 RX ADMIN — Medication 40 MILLIGRAM(S): at 18:52

## 2021-06-02 NOTE — CHART NOTE - NSCHARTNOTEFT_GEN_A_CORE
Requested to speak to Pt's  son DR Kemp about plan of care , Pt alert and oriented x 4  and  willing to share her medical information with son .   Pt's son stated that Pt has been on steroids on and off  since end of Feb   for Inflammatory response post COVID Vaccine   DR Becker aware   Minnie Lane NP-C [id number]

## 2021-06-02 NOTE — H&P ADULT - NSICDXFAMILYHX_GEN_ALL_CORE_FT
Asked by Brianne Espinal RN [facility], to contact the patient to help him with medication assistance. Telephoned the patient. Discussed his financial situation.      - Patient's monthly gross is $1,258. - He does not have Medicare Part D.  - He has been taking money from his [employer] through his employer. He has approximately $11,000 in that account. Completed Medicare's Extra Help application. Told patient he will receive a letter in approximately three weeks from [employer] stating whether or not he has qualified for that program.  Asked patient to call this CHW with the outcome of that letter. Patient stated he has \"about two weeks\" of medication. Told him this CHW is worried he will not hear from [employer] by the time his medications have ended. Provided patient with name and number to [employer]. Explained the program to the patient. Told patient to let them know that he has applied for Extra Help. Asked patient if he had other needs. He stated he does not. Mailing patient copy of his Extra Help application and this CHW's business card. He verbalized understanding.       Submitted by Adelso/MARILEE FAMILY HISTORY:  No pertinent family history in first degree relatives

## 2021-06-02 NOTE — ED ADULT NURSE REASSESSMENT NOTE - NS ED NURSE REASSESS COMMENT FT1
Received report from Edgar RN. Pt. A&Ox3, sitting up in stretcher, skin pink warm and dry. Pt. does not appear to be in any acute distress - nonlabored breathing noted and pt. is speaking in full coherent sentences. pt. is admitted and awaiting bed assignment. Aware of dispo and plan of care. Pt. does not have any complaints or concerns at this time. On CM. VSS/NAD. Safety and comfort provided.

## 2021-06-02 NOTE — H&P ADULT - NSHPPHYSICALEXAM_GEN_ALL_CORE
Vital Signs Last 24 Hrs  T(C): 36.8 (02 Jun 2021 07:55), Max: 36.8 (02 Jun 2021 07:55)  T(F): 98.3 (02 Jun 2021 07:55), Max: 98.3 (02 Jun 2021 07:55)  HR: 63 (02 Jun 2021 07:55) (55 - 67)  BP: 136/66 (02 Jun 2021 07:55) (115/59 - 145/82)  BP(mean): --  RR: 18 (02 Jun 2021 07:55) (16 - 19)  SpO2: 96% (02 Jun 2021 07:55) (95% - 96%)    PHYSICAL EXAM:  GENERAL: NAD, well-developed  HEAD:  Atraumatic, Normocephalic  EYES: EOMI, PERRLA, conjunctiva and sclera clear  NECK: Supple, No JVD  CHEST/LUNG: Clear to auscultation bilaterally; No wheeze  HEART: Regular rate and rhythm; No murmurs, rubs, or gallops  ABDOMEN: Soft, Nontender, Nondistended; Bowel sounds present  EXTREMITIES:  2+ Peripheral Pulses, No clubbing, cyanosis, or edema  PSYCH: AAOx3  NEUROLOGY: non-focal  SKIN: No rashes or lesions

## 2021-06-02 NOTE — CONSULT NOTE ADULT - SUBJECTIVE AND OBJECTIVE BOX
CHIEF COMPLAINT:    HISTORY OF PRESENT ILLNESS:  60 y/o F with PMH hypothyroidism presenting to the ED c/o chest pressure and SOB x 2 weeks. States symptoms are worse with exertion and improve at rest. Endorses mild swelling in b/l legs. Denies fever, chills, nausea, or vomiting. No cough. No hx of blood clots. No family hx heart disease.    PAST MEDICAL & SURGICAL HISTORY:  Depression    Hypothyroid    S/P             MEDICATIONS:                  FAMILY HISTORY:  No pertinent family history in first degree relatives        SOCIAL HISTORY:    [ ] Non-smoker  [ ] Smoker  [ ] Alcohol    Allergies    No Known Allergies    Intolerances    	    REVIEW OF SYSTEMS:  CONSTITUTIONAL: No fever, weight loss, or fatigue  EYES: No eye pain, visual disturbances, or discharge  ENMT:  No difficulty hearing, tinnitus, vertigo; No sinus or throat pain  NECK: No pain or stiffness  RESPIRATORY: No cough, wheezing, chills or hemoptysis; No Shortness of Breath  CARDIOVASCULAR: + chest pain, palpitations, passing out, dizziness, or leg swelling  GASTROINTESTINAL: No abdominal or epigastric pain. No nausea, vomiting, or hematemesis; No diarrhea or constipation. No melena or hematochezia.  GENITOURINARY: No dysuria, frequency, hematuria, or incontinence  NEUROLOGICAL: No headaches, memory loss, loss of strength, numbness, or tremors  SKIN: No itching, burning, rashes, or lesions   LYMPH Nodes: No enlarged glands  ENDOCRINE: No heat or cold intolerance; No hair loss  MUSCULOSKELETAL: No joint pain or swelling; No muscle, back, or extremity pain  PSYCHIATRIC: No depression, anxiety, mood swings, or difficulty sleeping  HEME/LYMPH: No easy bruising, or bleeding gums  ALLERY AND IMMUNOLOGIC: No hives or eczema	    [ ] All others negative	  [ ] Unable to obtain    PHYSICAL EXAM:  T(C): 36.8 (21 @ 07:55), Max: 36.8 (21 @ 07:55)  HR: 63 (21 @ 07:55) (55 - 67)  BP: 136/66 (21 @ 07:55) (115/59 - 145/82)  RR: 18 (21 @ 07:55) (16 - 19)  SpO2: 96% (21 @ 07:55) (95% - 96%)  Wt(kg): --  I&O's Summary      Appearance: Normal	  HEENT:   Normal oral mucosa, PERRL, EOMI	  Lymphatic: No lymphadenopathy  Cardiovascular: Normal S1 S2, No JVD, No murmurs, No edema  Respiratory: Lungs clear to auscultation	  Psychiatry: A & O x 3, Mood & affect appropriate  Gastrointestinal:  Soft, Non-tender, + BS	  Skin: No rashes, No ecchymoses, No cyanosis	  Neurologic: Non-focal  Extremities: Normal range of motion, No clubbing, cyanosis or edema  Vascular: Peripheral pulses palpable 2+ bilaterally    TELEMETRY: 	    ECG:  	  RADIOLOGY:    OTHER: 	  	  LABS:	 	    CARDIAC MARKERS:    Troponin T, High Sensitivity Result: <6: Specimen not hemolyzed                               10.3   5.72  )-----------( 404      ( 2021 22:29 )             34.3     06-    140  |  105  |  10  ----------------------------<  109<H>  3.9   |  23  |  0.73    Ca    9.5      2021 22:29  Mg     2.0         TPro  7.7  /  Alb  4.2  /  TBili  0.2  /  DBili  x   /  AST  17  /  ALT  13  /  AlkPhos  50      proBNP: Serum Pro-Brain Natriuretic Peptide: 41 pg/mL ( @ 22:29)    Lipid Profile:   HgA1c:   TSH:            CHIEF COMPLAINT:    HISTORY OF PRESENT ILLNESS:  60 y/o F with PMH hypothyroidism presenting to the ED c/o chest pressure and SOB x 2 weeks. States symptoms are worse with exertion and improve at rest. Endorses mild swelling in b/l legs. Denies fever, chills, nausea, or vomiting. No cough. No hx of blood clots. No family hx heart disease.    PAST MEDICAL & SURGICAL HISTORY:  Depression    Hypothyroid    S/P             MEDICATIONS:                  FAMILY HISTORY:  No pertinent family history in first degree relatives        SOCIAL HISTORY:    [ ] Non-smoker  [ ] Smoker  [ ] Alcohol    Allergies    No Known Allergies    Intolerances    	    REVIEW OF SYSTEMS:  CONSTITUTIONAL: No fever, weight loss, + fatigue  EYES: No eye pain, visual disturbances, or discharge  ENMT:  No difficulty hearing, tinnitus, vertigo; No sinus or throat pain  NECK: No pain or stiffness  RESPIRATORY: No cough, wheezing, chills or hemoptysis; + Shortness of Breath  CARDIOVASCULAR: + chest pain, palpitations, passing out, dizziness, or leg swelling  GASTROINTESTINAL: No abdominal or epigastric pain. No nausea, vomiting, or hematemesis; No diarrhea or constipation. No melena or hematochezia.  GENITOURINARY: No dysuria, frequency, hematuria, or incontinence  NEUROLOGICAL: No headaches, memory loss, loss of strength, numbness, or tremors  SKIN: No itching, burning, rashes, or lesions   LYMPH Nodes: No enlarged glands  ENDOCRINE: No heat or cold intolerance; No hair loss  MUSCULOSKELETAL: No joint pain or swelling; No muscle, back, or extremity pain  PSYCHIATRIC: No depression, anxiety, mood swings, or difficulty sleeping  HEME/LYMPH: No easy bruising, or bleeding gums  ALLERY AND IMMUNOLOGIC: No hives or eczema	    [ ] All others negative	  [ ] Unable to obtain    PHYSICAL EXAM:  T(C): 36.8 (21 @ 07:55), Max: 36.8 (21 @ 07:55)  HR: 63 (21 @ 07:55) (55 - 67)  BP: 136/66 (21 @ 07:55) (115/59 - 145/82)  RR: 18 (21 @ 07:55) (16 - 19)  SpO2: 96% (21 @ 07:55) (95% - 96%)  Wt(kg): --  I&O's Summary      Appearance: Normal	  HEENT:   Normal oral mucosa, PERRL, EOMI	  Lymphatic: No lymphadenopathy  Cardiovascular: Normal S1 S2, No JVD, No murmurs, No edema  Respiratory: Lungs clear to auscultation	  Psychiatry: A & O x 3, Mood & affect appropriate  Gastrointestinal:  Soft, Non-tender, + BS	  Skin: No rashes, No ecchymoses, No cyanosis	  Neurologic: Non-focal  Extremities: Normal range of motion, No clubbing, cyanosis or edema  Vascular: Peripheral pulses palpable 2+ bilaterally    TELEMETRY: SR 	    ECG:  	NSR non specific stt change   RADIOLOGY:  x< from: Xray Chest 1 View- PORTABLE-Urgent (Xray Chest 1 View- PORTABLE-Urgent .) (21 @ 00:43) >    EXAM:  XR CHEST PORTABLE URGENT 1V                            PROCEDURE DATE:  2021            INTERPRETATION:  CLINICAL INFORMATION: Chest pain.    TECHNIQUE: Frontal radiograph of the chest.    COMPARISON: Chest x-ray 2021.    FINDINGS:  Small bilateral pleural effusions with associated compressive atelectasis. The lungs are otherwise clear. No pneumothorax.  The heart is normal in size.  The visualized osseous structures are unremarkable.    IMPRESSION:  Small bilateral pleural effusions.              DAT RAMIREZ MD; Resident Radiology  This document has been electronically signed.  CHANDLER LOMELI MD; Attending Radiologist  This document has been electronically signed. 2021  7:56AM    < end of copied text >    OTHER: 	  	  LABS:	 	    CARDIAC MARKERS:    Troponin T, High Sensitivity Result: <6: Specimen not hemolyzed                               10.3   5.72  )-----------( 404      ( 2021 22:29 )             34.3     06-    140  |  105  |  10  ----------------------------<  109<H>  3.9   |  23  |  0.73    Ca    9.5      2021 22:29  Mg     2.0         TPro  7.7  /  Alb  4.2  /  TBili  0.2  /  DBili  x   /  AST  17  /  ALT  13  /  AlkPhos  50      proBNP: Serum Pro-Brain Natriuretic Peptide: 41 pg/mL ( @ 22:29)    Lipid Profile:   HgA1c:   TSH:

## 2021-06-02 NOTE — CONSULT NOTE ADULT - ASSESSMENT
60 y/o F with PMH hypothyroidism presenting to the ED c/o chest pressure and SOB x 2 weeks. States symptoms are worse with exertion and improve at rest.

## 2021-06-02 NOTE — H&P ADULT - ASSESSMENT
62 yo female h/o hypothyroid p/w chest pain, GONZALEZ    acs ruled out with neg trop x2  PE ruled out on CTA  check echo  check stress test  cards consulted  tele    hypothyroid  cont home synthroid    pulm edema on CT  check echo to eval EF    Advanced care planning was discussed with patient and family.  Advanced care planning forms were reviewed and discussed as appropriate.  Differential diagnosis and plan of care discussed with patient after the evaluation.   Pain assessed and judicious use of narcotics when appropriate was discussed.  Importance of Fall prevention discussed.  Counseling on Smoking and Alcohol cessation was offered when appropriate.  Counseling on Diet, exercise, and medication compliance was done.     Approx 70 minutes spent.

## 2021-06-03 DIAGNOSIS — R06.02 SHORTNESS OF BREATH: ICD-10-CM

## 2021-06-03 DIAGNOSIS — F32.9 MAJOR DEPRESSIVE DISORDER, SINGLE EPISODE, UNSPECIFIED: ICD-10-CM

## 2021-06-03 DIAGNOSIS — E03.9 HYPOTHYROIDISM, UNSPECIFIED: ICD-10-CM

## 2021-06-03 LAB
ANION GAP SERPL CALC-SCNC: 13 MMOL/L — SIGNIFICANT CHANGE UP (ref 5–17)
BUN SERPL-MCNC: 11 MG/DL — SIGNIFICANT CHANGE UP (ref 7–23)
CALCIUM SERPL-MCNC: 9 MG/DL — SIGNIFICANT CHANGE UP (ref 8.4–10.5)
CHLORIDE SERPL-SCNC: 102 MMOL/L — SIGNIFICANT CHANGE UP (ref 96–108)
CHOLEST SERPL-MCNC: 167 MG/DL — SIGNIFICANT CHANGE UP
CO2 SERPL-SCNC: 25 MMOL/L — SIGNIFICANT CHANGE UP (ref 22–31)
COVID-19 SPIKE DOMAIN AB INTERP: POSITIVE
COVID-19 SPIKE DOMAIN ANTIBODY RESULT: >250 U/ML — HIGH
CREAT SERPL-MCNC: 0.8 MG/DL — SIGNIFICANT CHANGE UP (ref 0.5–1.3)
GLUCOSE SERPL-MCNC: 99 MG/DL — SIGNIFICANT CHANGE UP (ref 70–99)
HCT VFR BLD CALC: 35.6 % — SIGNIFICANT CHANGE UP (ref 34.5–45)
HCV AB S/CO SERPL IA: 0.59 S/CO — SIGNIFICANT CHANGE UP (ref 0–0.99)
HCV AB SERPL-IMP: SIGNIFICANT CHANGE UP
HDLC SERPL-MCNC: 44 MG/DL — LOW
HGB BLD-MCNC: 10.6 G/DL — LOW (ref 11.5–15.5)
LIPID PNL WITH DIRECT LDL SERPL: 88 MG/DL — SIGNIFICANT CHANGE UP
MCHC RBC-ENTMCNC: 18.9 PG — LOW (ref 27–34)
MCHC RBC-ENTMCNC: 29.8 GM/DL — LOW (ref 32–36)
MCV RBC AUTO: 63.3 FL — LOW (ref 80–100)
NON HDL CHOLESTEROL: 122 MG/DL — SIGNIFICANT CHANGE UP
NRBC # BLD: 0 /100 WBCS — SIGNIFICANT CHANGE UP (ref 0–0)
PLATELET # BLD AUTO: 386 K/UL — SIGNIFICANT CHANGE UP (ref 150–400)
POTASSIUM SERPL-MCNC: 3.6 MMOL/L — SIGNIFICANT CHANGE UP (ref 3.5–5.3)
POTASSIUM SERPL-SCNC: 3.6 MMOL/L — SIGNIFICANT CHANGE UP (ref 3.5–5.3)
RBC # BLD: 5.62 M/UL — HIGH (ref 3.8–5.2)
RBC # FLD: 18.1 % — HIGH (ref 10.3–14.5)
SARS-COV-2 IGG+IGM SERPL QL IA: >250 U/ML — HIGH
SARS-COV-2 IGG+IGM SERPL QL IA: POSITIVE
SODIUM SERPL-SCNC: 140 MMOL/L — SIGNIFICANT CHANGE UP (ref 135–145)
TRIGL SERPL-MCNC: 169 MG/DL — HIGH
TROPONIN T, HIGH SENSITIVITY RESULT: 8 NG/L — SIGNIFICANT CHANGE UP (ref 0–51)
WBC # BLD: 5.54 K/UL — SIGNIFICANT CHANGE UP (ref 3.8–10.5)
WBC # FLD AUTO: 5.54 K/UL — SIGNIFICANT CHANGE UP (ref 3.8–10.5)

## 2021-06-03 PROCEDURE — 93018 CV STRESS TEST I&R ONLY: CPT

## 2021-06-03 PROCEDURE — 93016 CV STRESS TEST SUPVJ ONLY: CPT

## 2021-06-03 PROCEDURE — 78452 HT MUSCLE IMAGE SPECT MULT: CPT | Mod: 26

## 2021-06-03 PROCEDURE — 93306 TTE W/DOPPLER COMPLETE: CPT | Mod: 26

## 2021-06-03 PROCEDURE — 71045 X-RAY EXAM CHEST 1 VIEW: CPT | Mod: 26

## 2021-06-03 RX ORDER — LANOLIN ALCOHOL/MO/W.PET/CERES
3 CREAM (GRAM) TOPICAL ONCE
Refills: 0 | Status: DISCONTINUED | OUTPATIENT
Start: 2021-06-03 | End: 2021-06-04

## 2021-06-03 RX ORDER — POTASSIUM CHLORIDE 20 MEQ
40 PACKET (EA) ORAL ONCE
Refills: 0 | Status: COMPLETED | OUTPATIENT
Start: 2021-06-03 | End: 2021-06-03

## 2021-06-03 RX ORDER — FUROSEMIDE 40 MG
40 TABLET ORAL EVERY 12 HOURS
Refills: 0 | Status: DISCONTINUED | OUTPATIENT
Start: 2021-06-03 | End: 2021-06-04

## 2021-06-03 RX ADMIN — PREGABALIN 1000 MICROGRAM(S): 225 CAPSULE ORAL at 17:08

## 2021-06-03 RX ADMIN — ESCITALOPRAM OXALATE 10 MILLIGRAM(S): 10 TABLET, FILM COATED ORAL at 21:20

## 2021-06-03 RX ADMIN — Medication 145 MILLIGRAM(S): at 23:00

## 2021-06-03 RX ADMIN — Medication 100 MICROGRAM(S): at 05:57

## 2021-06-03 RX ADMIN — Medication 1000 UNIT(S): at 17:07

## 2021-06-03 RX ADMIN — Medication 40 MILLIEQUIVALENT(S): at 17:10

## 2021-06-03 RX ADMIN — ESCITALOPRAM OXALATE 20 MILLIGRAM(S): 10 TABLET, FILM COATED ORAL at 17:07

## 2021-06-03 RX ADMIN — Medication 40 MILLIGRAM(S): at 17:11

## 2021-06-03 RX ADMIN — HEPARIN SODIUM 5000 UNIT(S): 5000 INJECTION INTRAVENOUS; SUBCUTANEOUS at 05:57

## 2021-06-03 RX ADMIN — Medication 81 MILLIGRAM(S): at 17:07

## 2021-06-03 RX ADMIN — RISPERIDONE 1 MILLIGRAM(S): 4 TABLET ORAL at 21:20

## 2021-06-04 ENCOUNTER — TRANSCRIPTION ENCOUNTER (OUTPATIENT)
Age: 61
End: 2021-06-04

## 2021-06-04 VITALS — DIASTOLIC BLOOD PRESSURE: 68 MMHG | SYSTOLIC BLOOD PRESSURE: 104 MMHG | HEART RATE: 73 BPM

## 2021-06-04 LAB
ANION GAP SERPL CALC-SCNC: 13 MMOL/L — SIGNIFICANT CHANGE UP (ref 5–17)
BUN SERPL-MCNC: 14 MG/DL — SIGNIFICANT CHANGE UP (ref 7–23)
CALCIUM SERPL-MCNC: 9.7 MG/DL — SIGNIFICANT CHANGE UP (ref 8.4–10.5)
CHLORIDE SERPL-SCNC: 98 MMOL/L — SIGNIFICANT CHANGE UP (ref 96–108)
CO2 SERPL-SCNC: 26 MMOL/L — SIGNIFICANT CHANGE UP (ref 22–31)
CREAT SERPL-MCNC: 0.85 MG/DL — SIGNIFICANT CHANGE UP (ref 0.5–1.3)
GLUCOSE SERPL-MCNC: 114 MG/DL — HIGH (ref 70–99)
POTASSIUM SERPL-MCNC: 3.8 MMOL/L — SIGNIFICANT CHANGE UP (ref 3.5–5.3)
POTASSIUM SERPL-SCNC: 3.8 MMOL/L — SIGNIFICANT CHANGE UP (ref 3.5–5.3)
SODIUM SERPL-SCNC: 137 MMOL/L — SIGNIFICANT CHANGE UP (ref 135–145)
T3 SERPL-MCNC: 84 NG/DL — SIGNIFICANT CHANGE UP (ref 80–200)
T4 AB SER-ACNC: 9.1 UG/DL — SIGNIFICANT CHANGE UP (ref 4.6–12)
TSH SERPL-MCNC: 2.94 UIU/ML — SIGNIFICANT CHANGE UP (ref 0.27–4.2)

## 2021-06-04 PROCEDURE — 83735 ASSAY OF MAGNESIUM: CPT

## 2021-06-04 PROCEDURE — 86769 SARS-COV-2 COVID-19 ANTIBODY: CPT

## 2021-06-04 PROCEDURE — 93306 TTE W/DOPPLER COMPLETE: CPT

## 2021-06-04 PROCEDURE — 84443 ASSAY THYROID STIM HORMONE: CPT

## 2021-06-04 PROCEDURE — 80053 COMPREHEN METABOLIC PANEL: CPT

## 2021-06-04 PROCEDURE — 80061 LIPID PANEL: CPT

## 2021-06-04 PROCEDURE — 85379 FIBRIN DEGRADATION QUANT: CPT

## 2021-06-04 PROCEDURE — 78452 HT MUSCLE IMAGE SPECT MULT: CPT

## 2021-06-04 PROCEDURE — A9500: CPT

## 2021-06-04 PROCEDURE — 83880 ASSAY OF NATRIURETIC PEPTIDE: CPT

## 2021-06-04 PROCEDURE — 80048 BASIC METABOLIC PNL TOTAL CA: CPT

## 2021-06-04 PROCEDURE — 84436 ASSAY OF TOTAL THYROXINE: CPT

## 2021-06-04 PROCEDURE — 93010 ELECTROCARDIOGRAM REPORT: CPT

## 2021-06-04 PROCEDURE — U0003: CPT

## 2021-06-04 PROCEDURE — 85027 COMPLETE CBC AUTOMATED: CPT

## 2021-06-04 PROCEDURE — 71045 X-RAY EXAM CHEST 1 VIEW: CPT

## 2021-06-04 PROCEDURE — 86803 HEPATITIS C AB TEST: CPT

## 2021-06-04 PROCEDURE — 96374 THER/PROPH/DIAG INJ IV PUSH: CPT

## 2021-06-04 PROCEDURE — 99285 EMERGENCY DEPT VISIT HI MDM: CPT | Mod: 25

## 2021-06-04 PROCEDURE — 71275 CT ANGIOGRAPHY CHEST: CPT

## 2021-06-04 PROCEDURE — 85025 COMPLETE CBC W/AUTO DIFF WBC: CPT

## 2021-06-04 PROCEDURE — 93017 CV STRESS TEST TRACING ONLY: CPT

## 2021-06-04 PROCEDURE — 93005 ELECTROCARDIOGRAM TRACING: CPT

## 2021-06-04 PROCEDURE — 84480 ASSAY TRIIODOTHYRONINE (T3): CPT

## 2021-06-04 PROCEDURE — 84484 ASSAY OF TROPONIN QUANT: CPT

## 2021-06-04 RX ORDER — FENOFIBRATE,MICRONIZED 130 MG
1 CAPSULE ORAL
Qty: 0 | Refills: 0 | DISCHARGE

## 2021-06-04 RX ORDER — FUROSEMIDE 40 MG
1 TABLET ORAL
Qty: 30 | Refills: 0
Start: 2021-06-04 | End: 2021-07-03

## 2021-06-04 RX ORDER — FENOFIBRATE,MICRONIZED 130 MG
1 CAPSULE ORAL
Qty: 30 | Refills: 0
Start: 2021-06-04 | End: 2021-07-03

## 2021-06-04 RX ADMIN — HEPARIN SODIUM 5000 UNIT(S): 5000 INJECTION INTRAVENOUS; SUBCUTANEOUS at 05:39

## 2021-06-04 RX ADMIN — Medication 40 MILLIGRAM(S): at 05:40

## 2021-06-04 RX ADMIN — Medication 40 MILLIGRAM(S): at 17:05

## 2021-06-04 RX ADMIN — Medication 100 MICROGRAM(S): at 05:39

## 2021-06-04 RX ADMIN — Medication 1000 UNIT(S): at 11:20

## 2021-06-04 RX ADMIN — PREGABALIN 1000 MICROGRAM(S): 225 CAPSULE ORAL at 11:20

## 2021-06-04 RX ADMIN — ESCITALOPRAM OXALATE 20 MILLIGRAM(S): 10 TABLET, FILM COATED ORAL at 11:21

## 2021-06-04 RX ADMIN — Medication 81 MILLIGRAM(S): at 11:20

## 2021-06-04 NOTE — PROGRESS NOTE ADULT - SUBJECTIVE AND OBJECTIVE BOX
Subjective: Patient seen and examined. No new events except as noted.   feeling better      REVIEW OF SYSTEMS:    CONSTITUTIONAL: No weakness, fevers or chills  EYES/ENT: No visual changes;  No vertigo or throat pain   NECK: No pain or stiffness  RESPIRATORY: No cough, wheezing, hemoptysis; + shortness of breath  CARDIOVASCULAR: No chest pain or palpitations  GASTROINTESTINAL: No abdominal or epigastric pain. No nausea, vomiting, or hematemesis; No diarrhea or constipation. No melena or hematochezia.  GENITOURINARY: No dysuria, frequency or hematuria  NEUROLOGICAL: No numbness or weakness  SKIN: No itching, burning, rashes, or lesions   All other review of systems is negative unless indicated above.    MEDICATIONS:  MEDICATIONS  (STANDING):  aspirin enteric coated 81 milliGRAM(s) Oral daily  cholecalciferol 1000 Unit(s) Oral daily  cyanocobalamin 1000 MICROGram(s) Oral daily  escitalopram 20 milliGRAM(s) Oral daily  escitalopram 10 milliGRAM(s) Oral at bedtime  fenofibrate Tablet 145 milliGRAM(s) Oral daily  heparin   Injectable 5000 Unit(s) SubCutaneous every 8 hours  levothyroxine 100 MICROGram(s) Oral daily  risperiDONE   Tablet 1 milliGRAM(s) Oral daily      PHYSICAL EXAM:  T(C): 37 (06-03-21 @ 04:02), Max: 37 (06-03-21 @ 04:02)  HR: 70 (06-03-21 @ 04:02) (70 - 79)  BP: 101/82 (06-03-21 @ 04:02) (101/82 - 141/85)  RR: 18 (06-03-21 @ 04:02) (16 - 18)  SpO2: 93% (06-03-21 @ 04:02) (93% - 96%)  Wt(kg): --  I&O's Summary    02 Jun 2021 07:01  -  03 Jun 2021 07:00  --------------------------------------------------------  IN: 480 mL / OUT: 0 mL / NET: 480 mL          Appearance: NAD	  HEENT: Dry oral mucosa, PERRL, EOMI	  Lymphatic: No lymphadenopathy , no edema  Cardiovascular: Normal S1 S2, No JVD, No murmurs , Peripheral pulses palpable 2+ bilaterally  Respiratory: Decreased bs 	  Gastrointestinal:  Soft, Non-tender, + BS	  Skin: No rashes, No ecchymoses, No cyanosis, warm to touch  Musculoskeletal: Normal range of motion, normal strength  Psychiatry:  Mood & affect appropriate  Ext: No edema      LABS:    CARDIAC MARKERS:                                10.6   5.54  )-----------( 386      ( 03 Jun 2021 07:01 )             35.6     06-03    140  |  102  |  11  ----------------------------<  99  3.6   |  25  |  0.80    Ca    9.0      03 Jun 2021 07:01  Mg     2.0     06-01    TPro  7.7  /  Alb  4.2  /  TBili  0.2  /  DBili  x   /  AST  17  /  ALT  13  /  AlkPhos  50  06-01    proBNP:   Lipid Profile:   HgA1c:   TSH:     Serum Pro-Brain Natriuretic Peptide: 41 pg/mL (06.01.21 @ 22:29)         TELEMETRY: 	SR     ECG:  NORMAL SINUS RHYTHM  NONSPECIFIC T WAVE ABNORMALITY      RADIOLOGY: c< from: CT Angio Chest PE Protocol w/ IV Cont (06.02.21 @ 02:16) >    EXAM:  CT ANGIO CHEST PULM [facility]                            PROCEDURE DATE:  06/02/2021            INTERPRETATION:  CLINICAL INFORMATION: Shortness of breath. Evaluate for pulmonary embolism.    COMPARISON: None.    CONTRAST/COMPLICATIONS:  IV Contrast: Omnipaque 350.  78 cc administered.   22 cc discarded.  Oral Contrast: None.  Complications: None.    PROCEDURE:  CT Angiography of the Chest.  Sagittal and coronal reformats were performed as well as 3D (MIP) reconstructions.    FINDINGS:    LUNGS AND AIRWAYS: Interlobular septal thickening with groundglass attenuation. Discoid atelectasis in the right middle lobe. Patchy bibasilar opacities, likely represent dependent atelectasis.  PLEURA: Moderate bilateral pleural effusions, right greater than left extending to apices.  MEDIASTINUM AND CIARA: No lymphadenopathy.  VESSELS: No pulmonary embolism in the main, right, left or lobar pulmonary arteries. Segmental and subsegmental pulmonary arteries not well evaluated secondary to motion artifact.  HEART: Mild cardiomegaly. No pericardial effusion.  CHEST WALL AND LOWER NECK: Within normal limits.  VISUALIZED UPPER ABDOMEN: Cholelithiasis.  BONES: Degenerative changes of the spine.    IMPRESSION:    1.  No central pulmonary embolism. Suboptimally assessed segmental to subsegmental branches.  2.  Moderate bilateral pleural effusions, right greater than left with bilateral lower lobe subsegmental atelectasis.  3.  Interlobular septal thickening with groundglass attenuation. Constellation of findings likely represent pulmonary edema or volume overload. Recommend correlation to assess for superimposed atypical viral infection including COVID-19.                RADHA POOL MD; Resident Radiology  This document has been electronically signed.  GAYLE BROWN MD; Attending Radiologist  This document has been electronically signed. Jun 2 2021  4:45AM    < end of copied text >    DIAGNOSTIC TESTING:  [ ] Echocardiogram:  [ ]  Catheterization:  [ ] Stress Test:    OTHER: 	          
ELHAM MARC  61y Female  MRN:[id number]    Patient is a 61y old  Female who presents with a chief complaint of chest pain (2021 09:01)    HPI:   62 y/o F with PMH hypothyroidism presenting to the ED c/o chest pressure and SOB x 2 weeks. States symptoms are worse with exertion and improve at rest. Endorses mild swelling in b/l legs. Denies fever, chills, nausea, or vomiting. No cough. No hx of blood clots. No family hx heart disease. (2021 11:51)      Patient seen and evaluated at bedside. No acute events overnight except as noted.    Interval HPI: received IV lasix o/n.  feels better today     PAST MEDICAL & SURGICAL HISTORY:  Depression    Hypothyroid    S/P         REVIEW OF SYSTEMS:  as per hpi     VITALS:  Vital Signs Last 24 Hrs  T(C): 37 (2021 04:02), Max: 37 (2021 04:02)  T(F): 98.6 (2021 04:02), Max: 98.6 (2021 04:02)  HR: 70 (2021 04:02) (70 - 79)  BP: 101/82 (2021 04:02) (101/82 - 141/85)  BP(mean): --  RR: 18 (2021 04:02) (16 - 18)  SpO2: 93% (2021 04:02) (93% - 96%)  CAPILLARY BLOOD GLUCOSE        I&O's Summary    2021 07:01  -  2021 07:00  --------------------------------------------------------  IN: 480 mL / OUT: 0 mL / NET: 480 mL    2021 07:01  -  2021 13:41  --------------------------------------------------------  IN: 360 mL / OUT: 0 mL / NET: 360 mL        PHYSICAL EXAM:  GENERAL: NAD, well-developed  HEAD:  Atraumatic, Normocephalic  EYES: EOMI, PERRLA, conjunctiva and sclera clear  NECK: Supple, No JVD  CHEST/LUNG: Clear to auscultation bilaterally; No wheeze  HEART: S1, S2; No murmurs, rubs, or gallops  ABDOMEN: Soft, Nontender, Nondistended; Bowel sounds present  EXTREMITIES:  2+ Peripheral Pulses, No clubbing, cyanosis, or edema  PSYCH: Normal affect  NEUROLOGY: AAOX3; non-focal  SKIN: No rashes or lesions    Consultant(s) Notes Reviewed:  [x ] YES  [ ] NO  Care Discussed with Consultants/Other Providers [ x] YES  [ ] NO    MEDS:  MEDICATIONS  (STANDING):  aspirin enteric coated 81 milliGRAM(s) Oral daily  cholecalciferol 1000 Unit(s) Oral daily  cyanocobalamin 1000 MICROGram(s) Oral daily  escitalopram 20 milliGRAM(s) Oral daily  escitalopram 10 milliGRAM(s) Oral at bedtime  fenofibrate Tablet 145 milliGRAM(s) Oral daily  furosemide   Injectable 40 milliGRAM(s) IV Push every 12 hours  heparin   Injectable 5000 Unit(s) SubCutaneous every 8 hours  levothyroxine 100 MICROGram(s) Oral daily  potassium chloride    Tablet ER 40 milliEquivalent(s) Oral once  risperiDONE   Tablet 1 milliGRAM(s) Oral daily    MEDICATIONS  (PRN):    ALLERGIES:  No Known Allergies      LABS:                        10.6   5.54  )-----------( 386      ( 2021 07:01 )             35.6     06-03    140  |  102  |  11  ----------------------------<  99  3.6   |  25  |  0.80    Ca    9.0      2021 07:01  Mg     2.0     06-01    TPro  7.7  /  Alb  4.2  /  TBili  0.2  /  DBili  x   /  AST  17  /  ALT  13  /  AlkPhos  50  06-01          LIVER FUNCTIONS - ( 2021 22:29 )  Alb: 4.2 g/dL / Pro: 7.7 g/dL / ALK PHOS: 50 U/L / ALT: 13 U/L / AST: 17 U/L / GGT: x              < from: CT Angio Chest PE Protocol w/ IV Cont (21 @ 02:16) >    IMPRESSION:    1.  No central pulmonary embolism. Suboptimally assessed segmental to subsegmental branches.  2.  Moderate bilateral pleural effusions, right greater than left with bilateral lower lobe subsegmental atelectasis.  3.  Interlobular septal thickening with groundglass attenuation. Constellation of findings likely represent pulmonary edema or volume overload. Recommend correlation to assess for superimposed atypical viral infection including COVID-19.      < end of copied text >    < from: Xray Chest 1 View- PORTABLE-Urgent (Xray Chest 1 View- PORTABLE-Urgent .) (21 @ 00:43) >  IMPRESSION:  Small bilateral pleural effusions.    < end of copied text >      < from: Transthoracic Echocardiogram (21 @ 08:03) >  --------------------------  Observations:  Mitral Valve: Normal mitral valve.  Aortic Valve/Aorta: Normal trileaflet aortic valve. Peak  transaortic valve gradient equals 8 mm Hg, mean transaortic  valve gradient equals 5 mm Hg, aortic valve velocity time  integral equals 26 cm. Peak left ventricular outflow tract  gradient equals 3 mm Hg, LVOT velocity time integral equals  14 cm.  Aortic Root: 2.7 cm.  Left Atrium: Normal left atrium.  LA volume index = 17  cc/m2.  Left Ventricle: Normal left ventricular systolic function.  No segmental wall motion abnormalities. Normal left  ventricular internal dimensions and wall thicknesses. Mild  diastolic dysfunction (Stage I).  Right Heart: Normal right atrium. Normal right ventricular  size and function. Normal tricuspid valve. Mild tricuspid  regurgitation. Normal pulmonic valve. Minimal pulmonic  regurgitation.  Pericardium/Pleura: Normal pericardium with no pericardial  effusion.  Left pleural effusion.  ------------------------------------------------------------------------  Conclusions:  1. Normal left ventricular internal dimensions and wall  thicknesses.  2. Normal left ventricular systolic function. No segmental  wall motion abnormalities.  3. Normal right ventricular size and function.  *** No recent echocardiogram for comparison.  ---------------------------------------    < end of copied text >  
ELHAM MARC  61y Female  MRN:[id number]    Patient is a 61y old  Female who presents with a chief complaint of chest pain (2021 09:01)    HPI:   62 y/o F with PMH hypothyroidism presenting to the ED c/o chest pressure and SOB x 2 weeks. States symptoms are worse with exertion and improve at rest. Endorses mild swelling in b/l legs. Denies fever, chills, nausea, or vomiting. No cough. No hx of blood clots. No family hx heart disease. (2021 11:51)      Patient seen and evaluated at bedside. No acute events overnight except as noted.    Interval HPI:  feels better today     PAST MEDICAL & SURGICAL HISTORY:  Depression    Hypothyroid    S/P         REVIEW OF SYSTEMS:  as per hpi     VITALS:   Vital Signs Last 24 Hrs  T(C): 36.5 (2021 12:58), Max: 36.9 (2021 11:23)  T(F): 97.7 (2021 12:58), Max: 98.4 (2021 11:23)  HR: 76 (2021 12:58) (60 - 77)  BP: 105/69 (2021 12:58) (105/69 - 132/75)  BP(mean): --  RR: 18 (2021 12:58) (18 - 18)  SpO2: 93% (2021 12:58) (93% - 96%)        PHYSICAL EXAM:  GENERAL: NAD, well-developed  HEAD:  Atraumatic, Normocephalic  EYES: EOMI, PERRLA, conjunctiva and sclera clear  NECK: Supple, No JVD  CHEST/LUNG: Clear to auscultation bilaterally; No wheeze  HEART: S1, S2; No murmurs, rubs, or gallops  ABDOMEN: Soft, Nontender, Nondistended; Bowel sounds present  EXTREMITIES:  2+ Peripheral Pulses, No clubbing, cyanosis, or edema  PSYCH: Normal affect  NEUROLOGY: AAOX3; non-focal  SKIN: No rashes or lesions    Consultant(s) Notes Reviewed:  [x ] YES  [ ] NO  Care Discussed with Consultants/Other Providers [ x] YES  [ ] NO    MEDS:   MEDICATIONS  (STANDING):  aspirin enteric coated 81 milliGRAM(s) Oral daily  cholecalciferol 1000 Unit(s) Oral daily  cyanocobalamin 1000 MICROGram(s) Oral daily  escitalopram 20 milliGRAM(s) Oral daily  escitalopram 10 milliGRAM(s) Oral at bedtime  fenofibrate Tablet 145 milliGRAM(s) Oral daily  furosemide   Injectable 40 milliGRAM(s) IV Push every 12 hours  heparin   Injectable 5000 Unit(s) SubCutaneous every 8 hours  levothyroxine 100 MICROGram(s) Oral daily  melatonin 3 milliGRAM(s) Oral once  risperiDONE   Tablet 1 milliGRAM(s) Oral daily    MEDICATIONS  (PRN):      ALLERGIES:  No Known Allergies      LABS:                                       10.6   5.54  )-----------( 386      ( 2021 07:01 )             35.6   06-04    137  |  98  |  14  ----------------------------<  114<H>  3.8   |  26  |  0.85    Ca    9.7      2021 06:54         < from: CT Angio Chest PE Protocol w/ IV Cont (21 @ 02:16) >    IMPRESSION:    1.  No central pulmonary embolism. Suboptimally assessed segmental to subsegmental branches.  2.  Moderate bilateral pleural effusions, right greater than left with bilateral lower lobe subsegmental atelectasis.  3.  Interlobular septal thickening with groundglass attenuation. Constellation of findings likely represent pulmonary edema or volume overload. Recommend correlation to assess for superimposed atypical viral infection including COVID-19.      < end of copied text >    < from: Xray Chest 1 View- PORTABLE-Urgent (Xray Chest 1 View- PORTABLE-Urgent .) (21 @ 00:43) >  IMPRESSION:  Small bilateral pleural effusions.    < end of copied text >      < from: Transthoracic Echocardiogram (21 @ 08:03) >  --------------------------  Observations:  Mitral Valve: Normal mitral valve.  Aortic Valve/Aorta: Normal trileaflet aortic valve. Peak  transaortic valve gradient equals 8 mm Hg, mean transaortic  valve gradient equals 5 mm Hg, aortic valve velocity time  integral equals 26 cm. Peak left ventricular outflow tract  gradient equals 3 mm Hg, LVOT velocity time integral equals  14 cm.  Aortic Root: 2.7 cm.  Left Atrium: Normal left atrium.  LA volume index = 17  cc/m2.  Left Ventricle: Normal left ventricular systolic function.  No segmental wall motion abnormalities. Normal left  ventricular internal dimensions and wall thicknesses. Mild  diastolic dysfunction (Stage I).  Right Heart: Normal right atrium. Normal right ventricular  size and function. Normal tricuspid valve. Mild tricuspid  regurgitation. Normal pulmonic valve. Minimal pulmonic  regurgitation.  Pericardium/Pleura: Normal pericardium with no pericardial  effusion.  Left pleural effusion.  ------------------------------------------------------------------------  Conclusions:  1. Normal left ventricular internal dimensions and wall  thicknesses.  2. Normal left ventricular systolic function. No segmental  wall motion abnormalities.  3. Normal right ventricular size and function.  *** No recent echocardiogram for comparison.  ---------------------------------------    < end of copied text >        < from: Nuclear Stress Test-Exercise (Nuclear Stress Test-Exercise .) (21 @ 14:42) >  -----------------------  IMPRESSIONS:Probably Normal Study  * Exercise capacity: 8 METS, Average for age and gender.  * Chest Pain: No chest pain with exercise.  *Symptom: Fatigue.  * HR Response: Appropriate.  * BP Response: Appropriate.  * Heart Rhythm: Normal Sinus Rhythm - 76 BPM.  * Baseline ECG: Nonspecific ST-T wave abnormality.  * ECG Changes: No significant changes from baseline EKG. .  * Arrhythmia: Single VPD occurred.  * Ruiz Score: 6  * The left ventricle was normal in size. There is a small,  mild defect in the distal anterior wall that corrects with  prone imaging and demonstrates normal wall motion,  suggestive of breast attenuation artifact.  * Post-stress gated wall motion analysis was performed  (LVEF = 57 %;LVEDV = 71 ml.), revealing normal LV  function.  Conclusion:  The left ventricle was normal in size. There is a small,  mild defect in the distal anterior wall that corrects with  prone imaging and demonstrates normal wall motion,  suggestive of breast attenuation artifact.  -----------------------------------------------    < end of copied text >  
Subjective: Patient seen and examined. No new events except as noted.   feeling better      REVIEW OF SYSTEMS:    CONSTITUTIONAL: No weakness, fevers or chills  EYES/ENT: No visual changes;  No vertigo or throat pain   NECK: No pain or stiffness  RESPIRATORY: No cough, wheezing, hemoptysis; No shortness of breath  CARDIOVASCULAR: No chest pain or palpitations  GASTROINTESTINAL: No abdominal or epigastric pain. No nausea, vomiting, or hematemesis; No diarrhea or constipation. No melena or hematochezia.  GENITOURINARY: No dysuria, frequency or hematuria  NEUROLOGICAL: No numbness or weakness  SKIN: No itching, burning, rashes, or lesions   All other review of systems is negative unless indicated above.    MEDICATIONS:  MEDICATIONS  (STANDING):  aspirin enteric coated 81 milliGRAM(s) Oral daily  cholecalciferol 1000 Unit(s) Oral daily  cyanocobalamin 1000 MICROGram(s) Oral daily  escitalopram 20 milliGRAM(s) Oral daily  escitalopram 10 milliGRAM(s) Oral at bedtime  fenofibrate Tablet 145 milliGRAM(s) Oral daily  furosemide   Injectable 40 milliGRAM(s) IV Push every 12 hours  heparin   Injectable 5000 Unit(s) SubCutaneous every 8 hours  levothyroxine 100 MICROGram(s) Oral daily  melatonin 3 milliGRAM(s) Oral once  risperiDONE   Tablet 1 milliGRAM(s) Oral daily      PHYSICAL EXAM:  T(C): 36.8 (06-04-21 @ 04:22), Max: 36.8 (06-04-21 @ 04:22)  HR: 60 (06-04-21 @ 04:22) (60 - 77)  BP: 114/76 (06-04-21 @ 04:22) (110/72 - 132/75)  RR: 18 (06-04-21 @ 04:22) (18 - 18)  SpO2: 96% (06-04-21 @ 04:22) (94% - 96%)  Wt(kg): --  I&O's Summary    03 Jun 2021 07:01  -  04 Jun 2021 07:00  --------------------------------------------------------  IN: 840 mL / OUT: 0 mL / NET: 840 mL          Appearance: Normal	  HEENT:   Normal oral mucosa, PERRL, EOMI	  Lymphatic: No lymphadenopathy , no edema  Cardiovascular: Normal S1 S2, No JVD, No murmurs , Peripheral pulses palpable 2+ bilaterally  Respiratory: Lungs clear to auscultation, normal effort 	  Gastrointestinal:  Soft, Non-tender, + BS	  Skin: No rashes, No ecchymoses, No cyanosis, warm to touch  Musculoskeletal: Normal range of motion, normal strength  Psychiatry:  Mood & affect appropriate  Ext: No edema      LABS:    CARDIAC MARKERS:                                10.6   5.54  )-----------( 386      ( 03 Jun 2021 07:01 )             35.6     06-04    137  |  98  |  14  ----------------------------<  114<H>  3.8   |  26  |  0.85    Ca    9.7      04 Jun 2021 06:54      proBNP:   Lipid Profile:   HgA1c:   TSH:             TELEMETRY: 	SR     ECG:  	  RADIOLOGY:   DIAGNOSTIC TESTING:  [ ] Echocardiogram:  < from: Transthoracic Echocardiogram (06.03.21 @ 08:03) >    Patient name: Jenaro Kemp  YOB: 1960   Age: 61 (F)   MR#: [id number]  Study Date: 6/3/2021  [facility] RDCS  Study quality: Technically fair  Referring Physician: Daniel Madrid MD  Blood Pressure: 101/82 mmHg  Height: 152 cm  Weight: 76 kg  BSA: 1.7 m2  ------------------------------------------------------------------------  PROCEDURE: Transthoracic echocardiogram with 2-D, M-Mode  and complete spectral and color flow Doppler.  INDICATION: Chest pain, unspecified (R07.9)  ------------------------------------------------------------------------  Dimensions:    Normal Values:  LA:     2.7    2.0 - 4.0 cm  Ao:     2.7    2.0 - 3.8 cm  SEPTUM: 1.2    0.6 - 1.2 cm  PWT:    0.9    0.6 - 1.1 cm  LVIDd:  4.1    3.0 - 5.6 cm  LVIDs:  2.6    1.8 - 4.0 cm  Derived variables:  LVMI: 82 g/m2  RWT: 0.43  Fractional short: 37 %  EF (Ervin Rule): 72 %Doppler Peak Velocity (m/sec):  AoV=1.4  ------------------------------------------------------------------------  Observations:  Mitral Valve: Normal mitral valve.  Aortic Valve/Aorta: Normal trileaflet aortic valve. Peak  transaortic valve gradient equals 8 mm Hg, mean transaortic  valve gradient equals 5 mm Hg, aortic valve velocity time  integral equals 26 cm. Peak left ventricular outflow tract  gradient equals 3 mm Hg, LVOT velocity time integral equals  14 cm.  Aortic Root: 2.7 cm.  Left Atrium: Normal left atrium.  LA volume index = 17  cc/m2.  Left Ventricle: Normal left ventricular systolic function.  No segmental wall motion abnormalities. Normal left  ventricular internal dimensions and wall thicknesses. Mild  diastolic dysfunction (Stage I).  Right Heart: Normal right atrium. Normal right ventricular  size and function. Normal tricuspid valve. Mild tricuspid  regurgitation. Normal pulmonic valve. Minimal pulmonic  regurgitation.  Pericardium/Pleura: Normal pericardium with no pericardial  effusion.  Left pleural effusion.  ------------------------------------------------------------------------  Conclusions:  1. Normal left ventricular internal dimensions and wall  thicknesses.  2. Normal left ventricular systolic function. No segmental  wall motion abnormalities.  3. Normal right ventricular size and function.  *** No recent echocardiogram for comparison.  ------------------------------------------------------------------------  Confirmed on  6/3/2021 - 11:42:17 by JESSICA West  ------------------------------------------------------------------------    < end of copied text >    [ ]  Catheterization:  [ ] Stress Test:    OTHER:

## 2021-06-04 NOTE — PROGRESS NOTE ADULT - TIME BILLING
Advanced care planning was discussed with patient and family.  Advanced care planning forms were reviewed and discussed as appropriate.  Differential diagnosis and plan of care discussed with patient after the evaluation.   Pain assessed and judicious use of narcotics when appropriate was discussed.  Importance of Fall prevention discussed.  Counseling on Smoking and Alcohol cessation was offered when appropriate.  Counseling on Diet, exercise, and medication compliance was done.
Advanced care planning was discussed with patient and family.  Advanced care planning forms were reviewed and discussed as appropriate.  Differential diagnosis and plan of care discussed with patient after the evaluation.   Pain assessed and judicious use of narcotics when appropriate was discussed.  Importance of Fall prevention discussed.  Counseling on Smoking and Alcohol cessation was offered when appropriate.  Counseling on Diet, exercise, and medication compliance was done.

## 2021-06-04 NOTE — PROGRESS NOTE ADULT - PROBLEM SELECTOR PLAN 1
Pulmonary edema and pleural effusions   lasix 40 mg IV bid   Check TTE
Acute diastolic heart failure   Pulmonary edema and pleural effusions   lasix 40 mg IV bid   TTE reviewed

## 2021-06-04 NOTE — DISCHARGE NOTE PROVIDER - HOSPITAL COURSE
62 yo female h/o hypothyroid p/w chest pain, GONZALEZ  symptoms likely due to acute diastolic heart failure     acs ruled out with neg trop x2  PE ruled out on CTA  echo noted with stage one diastolic dysfunction  stress test noted  pt feeling better  change to po lasix 40 po daily  cards follow up  tele monitor     hypothyroid  cont home synthroid    pulm edema on CT  likely due to acute diastolic heart failure  clinically improved with lasix     dc planning home today on po lasix    outpt cards f/u next week    60 y/o F with PMH hypothyroidism presenting to the ED c/o chest pressure and SOB x 2 weeks. States symptoms are worse with exertion and improve at rest     Problem/Plan - 1:  ·  Problem: Shortness of breath.  Plan: Acute diastolic heart failure   Pulmonary edema and pleural effusions   lasix 40 mg IV bid   TTE reviewed.      Problem/Plan - 2:  ·  Problem: Hypothyroid.  Plan: on synthroid  check thyroid panel.    62 y/o F with PMH hypothyroidism presenting to the ED c/o chest pressure and SOB x 2 weeks. States symptoms are worse with exertion and improve at rest. ACS ruled out with neg trop x2, PE ruled on CTA -  Pulmonary edema and pleural effusions, Echo noted with stage one diastolic dysfunction - treated with IV lasix and transitioned to PO lasix upon discharge. Echo noted with stage one diastolic dysfunction. Patient seen by cardiology.  DCP and medication reconciliation discussed with Dr Art and in agreement. Patient is hemodynamically stable and cleared for discharge. Patient will follow up with cardiology after discharge

## 2021-06-04 NOTE — PROGRESS NOTE ADULT - ASSESSMENT
60 yo female h/o hypothyroid p/w chest pain, GONZALEZ  symptoms likely due to acute diastolic heart failure     acs ruled out with neg trop x2  PE ruled out on CTA  echo noted with stage one diastolic dysfunction  stress test noted  pt feeling better  change to po lasix 40 po daily  cards follow up  tele monitor     hypothyroid  cont home synthroid    pulm edema on CT  likely due to acute diastolic heart failure  clinically improved with lasix     dc planning home today on po lasix    outpt cards f/u next week      Advanced care planning was discussed with patient and family.  Advanced care planning forms were reviewed and discussed as appropriate.  Differential diagnosis and plan of care discussed with patient after the evaluation.   Pain assessed and judicious use of narcotics when appropriate was discussed.  Importance of Fall prevention discussed.  Counseling on Smoking and Alcohol cessation was offered when appropriate.  Counseling on Diet, exercise, and medication compliance was done.     Approx 70 minutes spent.
60 yo female h/o hypothyroid p/w chest pain, GONZALEZ  symptoms likely due to acute diastolic heart failure     acs ruled out with neg trop x2  PE ruled out on CTA  echo noted with stage one diastolic dysfunction  f/u stress test  cont diuresis with lasix iv as ordered  cards follow up  tele monitor     hypothyroid  cont home synthroid    pulm edema on CT  cont diuresis with lasix iv     Advanced care planning was discussed with patient and family.  Advanced care planning forms were reviewed and discussed as appropriate.  Differential diagnosis and plan of care discussed with patient after the evaluation.   Pain assessed and judicious use of narcotics when appropriate was discussed.  Importance of Fall prevention discussed.  Counseling on Smoking and Alcohol cessation was offered when appropriate.  Counseling on Diet, exercise, and medication compliance was done.     Approx 70 minutes spent.
62 y/o F with PMH hypothyroidism presenting to the ED c/o chest pressure and SOB x 2 weeks. States symptoms are worse with exertion and improve at rest
62 y/o F with PMH hypothyroidism presenting to the ED c/o chest pressure and SOB x 2 weeks. States symptoms are worse with exertion and improve at rest

## 2021-06-04 NOTE — DISCHARGE NOTE PROVIDER - NSDCCPCAREPLAN_GEN_ALL_CORE_FT
PRINCIPAL DISCHARGE DIAGNOSIS  Diagnosis: Pulmonary edema  Assessment and Plan of Treatment: Resolving  Continue with medications as prescribed by your doctor.  Please follow up with your primary care physician after discharge for continued monitoring and management.  If you have increase shortness of breath, please call your doctor.        SECONDARY DISCHARGE DIAGNOSES  Diagnosis: Hypothyroid  Assessment and Plan of Treatment: Hypothyroid  Continue to take your medication as prescribed by your doctor    Diagnosis: Acute diastolic congestive heart failure  Assessment and Plan of Treatment: Weigh yourself daily.  If you gain 3lbs in 3 days, or 5lbs in a week call your Health Care Provider.  Do not eat or drink foods containing more than 2000mg of salt (sodium) in your diet every day.  Call your Health Care Provider if you have any swelling or increased swelling in your feet, ankles, and/or stomach.  Take all of your medication as directed.  If you become dizzy call your Health Care Provider.

## 2021-06-04 NOTE — DISCHARGE NOTE NURSING/CASE MANAGEMENT/SOCIAL WORK - PATIENT PORTAL LINK FT
You can access the FollowMyHealth Patient Portal offered by [facility] by registering at the following website: [email or web address]. By joining [facility]’s FollowMyHealth portal, you will also be able to view your health information using other applications (apps) compatible with our system.

## 2021-06-04 NOTE — DISCHARGE NOTE PROVIDER - PROVIDER TOKENS
PROVIDER:[TOKEN:[[id number]],FOLLOWUP:[1 week]],PROVIDER:[TOKEN:[[id number]],FOLLOWUP:[2 weeks]]

## 2021-06-04 NOTE — DISCHARGE NOTE PROVIDER - CARE PROVIDER_API CALL
Arthur Jensen (DO)  Cardiology; Internal Medicine  [address], [address]  [place] [address]  Phone: [phone]  Fax: [phone]  Follow Up Time: 1 week    JESUS CHEN  Internal Medicine  [address]  [place] [address]  Phone: [phone]  Fax: [phone]  Follow Up Time: 2 weeks

## 2021-06-04 NOTE — DISCHARGE NOTE PROVIDER - NSDCMRMEDTOKEN_GEN_ALL_CORE_FT
Aspirin Enteric Coated 81 mg oral delayed release tablet: 1 tab(s) orally once a day  Daily Multi oral tablet: 1 tab(s) orally once a day  fenofibrate 160 mg oral tablet: 1 tab(s) orally once a day  Fish Oil oral capsule:   Lexapro 10 mg oral tablet: 1 tab(s) orally once a day in the PM  Lexapro 20 mg oral tablet: 1 tab(s) orally once a day in the AM  risperiDONE 1 mg oral tablet: 1 tab(s) orally once a day  Synthroid 100 mcg (0.1 mg) oral tablet: 1 tab(s) orally once a day  Vitamin B12:   Vitamin D3:   Zinc:    Aspirin Enteric Coated 81 mg oral delayed release tablet: 1 tab(s) orally once a day  Daily Multi oral tablet: 1 tab(s) orally once a day  Fish Oil oral capsule:   Lasix 40 mg oral tablet: 1 tab(s) orally once a day   Lexapro 10 mg oral tablet: 1 tab(s) orally once a day in the PM  Lexapro 20 mg oral tablet: 1 tab(s) orally once a day in the AM  risperiDONE 1 mg oral tablet: 1 tab(s) orally once a day  Synthroid 100 mcg (0.1 mg) oral tablet: 1 tab(s) orally once a day  TriCor 145 mg oral tablet: 1 tab(s) orally once a day  Vitamin B12:   Vitamin D3:   Zinc:

## 2021-07-16 DIAGNOSIS — R94.31 ABNORMAL ELECTROCARDIOGRAM [ECG] [EKG]: ICD-10-CM

## 2021-07-19 ENCOUNTER — APPOINTMENT (OUTPATIENT)
Dept: CARDIOLOGY | Facility: CLINIC | Age: 61
End: 2021-07-19
Payer: COMMERCIAL

## 2021-07-19 ENCOUNTER — NON-APPOINTMENT (OUTPATIENT)
Age: 61
End: 2021-07-19

## 2021-07-19 VITALS
BODY MASS INDEX: 32.1 KG/M2 | WEIGHT: 170 LBS | SYSTOLIC BLOOD PRESSURE: 121 MMHG | DIASTOLIC BLOOD PRESSURE: 78 MMHG | HEIGHT: 61 IN | OXYGEN SATURATION: 97 % | HEART RATE: 70 BPM | TEMPERATURE: 94.6 F | RESPIRATION RATE: 16 BRPM

## 2021-07-19 PROCEDURE — 93000 ELECTROCARDIOGRAM COMPLETE: CPT

## 2021-07-19 PROCEDURE — 99072 ADDL SUPL MATRL&STAF TM PHE: CPT

## 2021-07-19 PROCEDURE — 99204 OFFICE O/P NEW MOD 45 MIN: CPT

## 2021-07-19 NOTE — HISTORY OF PRESENT ILLNESS
[FreeTextEntry1] : This is the first outpatient visit for Ms. Kemp, a 62 yo woman.\par \par No known h/o heart disease. She presented to the ED c/o chest pressure and SOB x 2 weeks (June 2021). States symptoms worse with exertion and improve at rest. ACS  ruled out with neg trop x2, PE ruled on CTA - pulmonary edema and pleural effusions, echo noted with stage one diastolic dysfunction - treated with IV  lasix and transitioned to PO lasix upon discharge. Patient seen by cardiology. She had been on steroids leading up to this time to treat a possible reaction to the COVID vaccine. \par \par Echo 6/3/21:\par Normal valves\par LVEF 72%; mild diastolic dysfunction\par Left pleural effusion\par \par Stress nuclear: 6/3/21:\par Exercised 6'30" to 8 METS\par 88% MPHR\par Peak ; peak /84 mmHg\par no chest pain or EKG changes\par small, mild defect in distal anterior wall that corrects with prone imaging; normal regional wall motion; likely breast attenuation artifact\par \par Labs:\par BNP, trop normal\par DDimer 800\par chol 167, trig 169, HDL 44, LDL 88 mg/dL\par \par EKG today:\par Sinus rhythm \par Diffuse nonspecific T-abnormality\par ABNORMAL ECG\par \par Since hospitalization, she is doing fairly well. She is taking Lasix 40 mg daily, and seems to be back to baseline. No recurrent edema, SOB. No chest pain. She saw her PCP last week. \par \par Recent labs at PCP office:\par WBC 5.8, HB 10\par A1c 6.0%\par BUN 22, creat 0.68, K 3.6\par LFT's normal\par tchol 155, HDL 52, trig 129, LDL 80 mg/dL\par TSH 1.66

## 2021-07-27 ENCOUNTER — APPOINTMENT (OUTPATIENT)
Dept: PSYCHIATRY | Facility: CLINIC | Age: 61
End: 2021-07-27
Payer: COMMERCIAL

## 2021-07-27 PROCEDURE — 99072 ADDL SUPL MATRL&STAF TM PHE: CPT

## 2021-07-27 PROCEDURE — 99214 OFFICE O/P EST MOD 30 MIN: CPT

## 2021-07-27 RX ORDER — MELOXICAM 15 MG/1
15 TABLET ORAL
Qty: 30 | Refills: 0 | Status: DISCONTINUED | COMMUNITY
Start: 2021-05-03 | End: 2021-07-27

## 2021-07-27 RX ORDER — MELOXICAM 7.5 MG/1
7.5 TABLET ORAL TWICE DAILY
Qty: 30 | Refills: 0 | Status: DISCONTINUED | COMMUNITY
Start: 2021-02-26 | End: 2021-07-27

## 2021-09-28 NOTE — DISCHARGE NOTE NURSING/CASE MANAGEMENT/SOCIAL WORK - NSDCVIVACCINE_GEN_ALL_CORE_FT
Nerve conduction study is abnormal and shows electrophysiologic evidence for mild right carpal tunnel syndrome.  I have given her a wrist splint to wear nightly and she can use it on a nightly basis for the next 3 to 4 weeks.  She can follow-up with her primary care provider.  She will be no significant improvement of her symptoms she can be referred to orthopedic surgery for steroid injections.   No Vaccines Administered.

## 2021-12-29 ENCOUNTER — APPOINTMENT (OUTPATIENT)
Dept: PSYCHIATRY | Facility: CLINIC | Age: 61
End: 2021-12-29
Payer: COMMERCIAL

## 2021-12-29 PROCEDURE — 99214 OFFICE O/P EST MOD 30 MIN: CPT

## 2021-12-29 PROCEDURE — 99072 ADDL SUPL MATRL&STAF TM PHE: CPT

## 2021-12-29 RX ORDER — FUROSEMIDE 40 MG/1
40 TABLET ORAL
Qty: 90 | Refills: 0 | Status: ACTIVE | COMMUNITY
Start: 2021-12-08

## 2021-12-29 RX ORDER — MUPIROCIN 2 G/100G
2 CREAM TOPICAL
Qty: 30 | Refills: 0 | Status: DISCONTINUED | COMMUNITY
Start: 2021-11-08

## 2021-12-29 RX ORDER — AMOXICILLIN AND CLAVULANATE POTASSIUM 875; 125 MG/1; MG/1
875-125 TABLET, COATED ORAL
Qty: 20 | Refills: 0 | Status: COMPLETED | COMMUNITY
Start: 2021-11-08

## 2021-12-29 RX ORDER — FUROSEMIDE 20 MG/1
20 TABLET ORAL
Refills: 0 | Status: DISCONTINUED | COMMUNITY
Start: 2021-07-27 | End: 2021-12-29

## 2021-12-29 RX ORDER — POTASSIUM CHLORIDE 750 MG/1
10 TABLET, FILM COATED, EXTENDED RELEASE ORAL
Qty: 90 | Refills: 0 | Status: ACTIVE | COMMUNITY
Start: 2021-12-08

## 2022-03-30 ENCOUNTER — RX RENEWAL (OUTPATIENT)
Age: 62
End: 2022-03-30

## 2022-05-13 ENCOUNTER — APPOINTMENT (OUTPATIENT)
Dept: PSYCHIATRY | Facility: CLINIC | Age: 62
End: 2022-05-13
Payer: COMMERCIAL

## 2022-05-13 DIAGNOSIS — Z63.0 PROBLEMS IN RELATIONSHIP WITH SPOUSE OR PARTNER: ICD-10-CM

## 2022-05-13 PROCEDURE — 99214 OFFICE O/P EST MOD 30 MIN: CPT

## 2022-05-13 PROCEDURE — 99072 ADDL SUPL MATRL&STAF TM PHE: CPT

## 2022-05-13 SDOH — SOCIAL STABILITY - SOCIAL INSECURITY: PROBLEMS IN RELATIONSHIP WITH SPOUSE OR PARTNER: Z63.0

## 2022-05-14 PROBLEM — Z63.0 MARITAL RELATIONSHIP PROBLEM: Status: ACTIVE | Noted: 2021-05-02

## 2022-05-27 ENCOUNTER — APPOINTMENT (OUTPATIENT)
Dept: ORTHOPEDIC SURGERY | Facility: CLINIC | Age: 62
End: 2022-05-27
Payer: COMMERCIAL

## 2022-05-27 VITALS
BODY MASS INDEX: 32.1 KG/M2 | WEIGHT: 170 LBS | HEIGHT: 61 IN | DIASTOLIC BLOOD PRESSURE: 71 MMHG | HEART RATE: 65 BPM | SYSTOLIC BLOOD PRESSURE: 119 MMHG

## 2022-05-27 PROCEDURE — 99214 OFFICE O/P EST MOD 30 MIN: CPT

## 2022-05-27 PROCEDURE — 99072 ADDL SUPL MATRL&STAF TM PHE: CPT

## 2022-05-27 PROCEDURE — 73130 X-RAY EXAM OF HAND: CPT | Mod: 50

## 2022-05-27 RX ORDER — MELOXICAM 15 MG/1
15 TABLET ORAL DAILY
Qty: 1 | Refills: 0 | Status: ACTIVE | COMMUNITY
Start: 2022-05-27 | End: 1900-01-01

## 2022-05-27 RX ORDER — MELOXICAM 15 MG/1
15 TABLET ORAL DAILY
Qty: 1 | Refills: 1 | Status: ACTIVE | COMMUNITY
Start: 2022-05-27 | End: 1900-01-01

## 2022-05-27 NOTE — PHYSICAL EXAM
[UE] : Sensory: Intact in bilateral upper extremities [Rad] : radial 2+ and symmetric bilaterally [Normal] : Alert and in no acute distress [Poor Appearance] : well-appearing [Acute Distress] : not in acute distress [Obese] : not obese [de-identified] : The patient has no respiratory distress. Mood and affect are normal. The patient is alert and oriented to person, place and time.\par The patient reports no pain with motion of the shoulders.  The patient reports no pain with motion of the elbows.  Examination of the hands and wrists demonstrate diffuse tenderness.  She has swelling of MCP and IP joints in both hands.  She has limitation of flexion.  Tendon function is intact.  There is no triggering.  The skin is intact.  There is no lymphedema. [de-identified] : AP, lateral and oblique x-rays of both hands taken today demonstrate no fracture or dislocation.  She has degenerative changes of multiple joints in both hands.

## 2022-05-27 NOTE — DISCUSSION/SUMMARY
[de-identified] : The patient has arthritis of both hands.  I have discussed the pathology, natural history and treatment options with her.  She is started on a course of Mobic.  Medication risks have been reviewed.  She is referred for physical therapy.  She can be reevaluated in 6 weeks.

## 2022-05-27 NOTE — HISTORY OF PRESENT ILLNESS
[de-identified] : 62 year old RHD female presents with bilateral hand pain and swelling after receiving a Covid booster January 2021. She was seen by rheumatology, was diagnosed with inflammatory arthritis and was treated with steroids. She had been feeling better until 6 months ago when she started doing push ups and had a recurrence of pain. She has been taking Advil with no relief. Her hands feel stiff. She has been unable to make a tight fist. She complains of deformity of both index fingers.

## 2022-06-15 ENCOUNTER — APPOINTMENT (OUTPATIENT)
Dept: PULMONOLOGY | Facility: CLINIC | Age: 62
End: 2022-06-15
Payer: COMMERCIAL

## 2022-06-15 ENCOUNTER — APPOINTMENT (OUTPATIENT)
Dept: PULMONOLOGY | Facility: CLINIC | Age: 62
End: 2022-06-15

## 2022-06-15 VITALS
WEIGHT: 167 LBS | HEIGHT: 61 IN | SYSTOLIC BLOOD PRESSURE: 136 MMHG | OXYGEN SATURATION: 95 % | DIASTOLIC BLOOD PRESSURE: 81 MMHG | TEMPERATURE: 97.9 F | BODY MASS INDEX: 31.53 KG/M2 | HEART RATE: 64 BPM

## 2022-06-15 LAB — POCT - HEMOGLOBIN (HGB), QUANTITATIVE, TRANSCUTANEOUS: 9.3

## 2022-06-15 PROCEDURE — 94729 DIFFUSING CAPACITY: CPT

## 2022-06-15 PROCEDURE — 71046 X-RAY EXAM CHEST 2 VIEWS: CPT

## 2022-06-15 PROCEDURE — 94010 BREATHING CAPACITY TEST: CPT

## 2022-06-15 PROCEDURE — 99072 ADDL SUPL MATRL&STAF TM PHE: CPT

## 2022-06-15 PROCEDURE — 88738 HGB QUANT TRANSCUTANEOUS: CPT

## 2022-06-15 PROCEDURE — 94727 GAS DIL/WSHOT DETER LNG VOL: CPT

## 2022-06-15 PROCEDURE — ZZZZZ: CPT

## 2022-06-15 PROCEDURE — 99204 OFFICE O/P NEW MOD 45 MIN: CPT | Mod: 25

## 2022-06-15 NOTE — PROCEDURE
[FreeTextEntry1] : CXR: LLL opacity, no pleural effusions, cardiac silhouette appears normal.  No bony abnormality.\par \par Reviewed:\par EXAM: XR CHEST PORTABLE ROUTINE 1V\par \par \par PROCEDURE DATE: 06/03/2021\par \par INTERPRETATION: TECHNIQUE: A single AP view of the chest was obtained. Ordered time: 6/3/2021 9:03 AM\par \par COMPARISON: 6/2/2021\par \par CLINICAL INFORMATION: Edema\par \par FINDINGS:\par \par The heart size is not well assessed on AP film.\par There are small bilateral pleural effusions and bibasilar airspace disease likely atelectasis which is unchanged from the prior study.\par \par \par IMPRESSION:\par \par Bilateral pleural effusions and bibasilar atelectasis, no significant arellano\par \par \par REINA RODRIGUEZ M.D., ATTENDING RADIOLOGIST\par This document has been electronically signed.\par REINA RODRIGUEZ M.D., ATTENDING RADIOLOGIST\par This document has been electronically signed. Jun 4 2021 11:54AM

## 2022-06-15 NOTE — HISTORY OF PRESENT ILLNESS
[Never] : never [TextBox_4] : 62F hx of diastolic chf complicated by pulm edema last year, HLD, hypothyroid, depression\par \par wants to follow up on pulm edema\par will occasionally feel like she cant take full breath\par denies GONZALEZ\par no cough or wheeze\par no inhaler use\par no hx of asthma or copd\par never smoker

## 2022-06-24 DIAGNOSIS — M79.641 PAIN IN RIGHT HAND: ICD-10-CM

## 2022-06-24 DIAGNOSIS — M79.642 PAIN IN RIGHT HAND: ICD-10-CM

## 2022-06-29 ENCOUNTER — APPOINTMENT (OUTPATIENT)
Dept: PULMONOLOGY | Facility: CLINIC | Age: 62
End: 2022-06-29

## 2022-06-29 DIAGNOSIS — R06.83 SNORING: ICD-10-CM

## 2022-06-29 DIAGNOSIS — R93.89 ABNORMAL FINDINGS ON DIAGNOSTIC IMAGING OF OTHER SPECIFIED BODY STRUCTURES: ICD-10-CM

## 2022-06-29 PROCEDURE — 99214 OFFICE O/P EST MOD 30 MIN: CPT | Mod: 95

## 2022-06-29 NOTE — ASSESSMENT
[FreeTextEntry1] : follow up for HST\par fu with rheum and cards\par small effusions too small for intervention

## 2022-06-29 NOTE — PROCEDURE
[FreeTextEntry1] : CT chest reviewed see chart\par \par Prior exams reviewed:\par \par \par PFT: restriction with some BD response.\par \par CXR: LLL opacity, no pleural effusions, cardiac silhouette appears normal.  No bony abnormality.\par \par Reviewed:\par EXAM: XR CHEST PORTABLE ROUTINE 1V\par \par \par PROCEDURE DATE: 06/03/2021\par \par INTERPRETATION: TECHNIQUE: A single AP view of the chest was obtained. Ordered time: 6/3/2021 9:03 AM\par \par COMPARISON: 6/2/2021\par \par CLINICAL INFORMATION: Edema\par \par FINDINGS:\par \par The heart size is not well assessed on AP film.\par There are small bilateral pleural effusions and bibasilar airspace disease likely atelectasis which is unchanged from the prior study.\par \par \par IMPRESSION:\par \par Bilateral pleural effusions and bibasilar atelectasis, no significant arellano\par \par \par REINA RODRIGUEZ M.D., ATTENDING RADIOLOGIST\par This document has been electronically signed.\par REINA RODRIGUEZ M.D., ATTENDING RADIOLOGIST\par This document has been electronically signed. Jun 4 2021 11:54AM

## 2022-06-29 NOTE — REASON FOR VISIT
[Home] : at home, [unfilled] , at the time of the visit. [Medical Office: ([facility])___] : at the medical office located in  [Patient] : the patient

## 2022-06-29 NOTE — HISTORY OF PRESENT ILLNESS
[TextBox_4] : visit to review ct\par small bilat effusions, looks chronic\par \par may have seroneg RA per her rheum\par \par has some diallo\par \par snoring, requests sleep study

## 2022-07-18 ENCOUNTER — RX RENEWAL (OUTPATIENT)
Age: 62
End: 2022-07-18

## 2022-07-20 ENCOUNTER — RX RENEWAL (OUTPATIENT)
Age: 62
End: 2022-07-20

## 2022-08-08 ENCOUNTER — APPOINTMENT (OUTPATIENT)
Dept: CARDIOLOGY | Facility: CLINIC | Age: 62
End: 2022-08-08

## 2022-08-08 ENCOUNTER — NON-APPOINTMENT (OUTPATIENT)
Age: 62
End: 2022-08-08

## 2022-08-08 VITALS
TEMPERATURE: 98.7 F | SYSTOLIC BLOOD PRESSURE: 103 MMHG | BODY MASS INDEX: 30.99 KG/M2 | DIASTOLIC BLOOD PRESSURE: 63 MMHG | HEART RATE: 72 BPM | HEIGHT: 61 IN | OXYGEN SATURATION: 97 %

## 2022-08-08 VITALS — BODY MASS INDEX: 30.99 KG/M2 | WEIGHT: 164 LBS

## 2022-08-08 DIAGNOSIS — E78.5 HYPERLIPIDEMIA, UNSPECIFIED: ICD-10-CM

## 2022-08-08 DIAGNOSIS — I50.30 UNSPECIFIED DIASTOLIC (CONGESTIVE) HEART FAILURE: ICD-10-CM

## 2022-08-08 PROCEDURE — 99214 OFFICE O/P EST MOD 30 MIN: CPT

## 2022-08-08 PROCEDURE — 99072 ADDL SUPL MATRL&STAF TM PHE: CPT

## 2022-08-08 PROCEDURE — 93000 ELECTROCARDIOGRAM COMPLETE: CPT

## 2022-08-08 RX ORDER — ROSUVASTATIN CALCIUM 10 MG/1
10 TABLET, FILM COATED ORAL
Qty: 90 | Refills: 2 | Status: ACTIVE | COMMUNITY
Start: 2022-08-08 | End: 1900-01-01

## 2022-08-08 NOTE — HISTORY OF PRESENT ILLNESS
[FreeTextEntry1] : This is an outpatient f/u visit for Ms. Kemp, a 61 yo woman.\par \par No known h/o heart disease. She presented to the ED c/o chest pressure and SOB x 2 weeks (June 2021). ACS  ruled out with neg trop x2, PE ruled on CTA - pulmonary edema and pleural effusions, echo noted with stage one diastolic dysfunction - treated with IV  lasix and transitioned to PO lasix upon discharge. \par \par Echo 6/3/21:\par Normal valves\par LVEF 72%; mild diastolic dysfunction\par Left pleural effusion\par \par Stress nuclear: 6/3/21:\par Exercised 6'30" to 8 METS\par 88% MPHR\par Peak ; peak /84 mmHg\par no chest pain or EKG changes\par small, mild defect in distal anterior wall that corrects with prone imaging; normal regional wall motion; likely breast attenuation artifact\par \par Possible seroneg RA. Hypothyroidism. Depression. HLD. Never smoker. \par \par Labs:\par BNP, trop normal\par DDimer 800\par chol 167, trig 169, HDL 44, LDL 88 mg/dL\par \par At her last visit with me in June 2021, she had improved with Lasix. It was felt that her volume overload was likely secondary to steroids and HFpEF. Suggested continuation of low dose Lasix. \par \par Recent A1c 9.3%\par \par Recent CT chest: small bilateral pleural effusions with atelectasis. Mildly enlarged heart; no pericardial effusion. \par Recent PFT's: restrictive dysfunction; decreased lung volumes. Decreased DLCO. \par \par EKG today:\par  \par \par

## 2022-08-08 NOTE — HISTORY OF PRESENT ILLNESS
[FreeTextEntry1] : This is an outpatient f/u visit for Ms. Kemp, a 63 yo woman.\par \par No known h/o heart disease. She presented to the ED c/o chest pressure and SOB x 2 weeks (June 2021). ACS  ruled out with neg trop x2, PE ruled on CTA - pulmonary edema and pleural effusions, echo noted with stage one diastolic dysfunction - treated with IV  lasix and transitioned to PO lasix upon discharge. \par \par Echo 6/3/21:\par Normal valves\par LVEF 72%; mild diastolic dysfunction\par Left pleural effusion\par \par Stress nuclear: 6/3/21:\par Exercised 6'30" to 8 METS\par 88% MPHR\par Peak ; peak /84 mmHg\par no chest pain or EKG changes\par small, mild defect in distal anterior wall that corrects with prone imaging; normal regional wall motion; likely breast attenuation artifact\par \par Possible seroneg RA with arthralgias and deformity. She remains on 5 mg prednisone daily. Hypothyroidism. Depression. HLD. Never smoker. \par \par Nursing supervisor; [employer]\par \par Labs:\par BNP, trop normal\par DDimer 800\par chol 167, trig 169, HDL 44, LDL 88 mg/dL\par \par At her last visit with me in June 2021, she had improved with Lasix. It was felt that her volume overload was likely secondary to steroids and HFpEF. Suggested continuation of low dose Lasix. \par \par Recent CT chest: small bilateral pleural effusions with atelectasis. Mildly enlarged heart; no pericardial effusion. \par Recent PFT's: restrictive dysfunction; decreased lung volumes. Decreased DLCO. \par \par States that her breathing is stable/good. No chest pain. No dizziness/syncope. Remains on fenofibrate due to elevated triglycerides with good effect. Continues to have joint issues, on prednisone. \par \par Recent labs:\par K 4.5, LFT's normal\par BUN 29, creat 0.91\par ESR 19; platelet ct 559k; HCT 32%\par TSH normal\par Recent A1c 9.3%\par \par EKG today:\par Sinus rhythm \par Nonspecific T-abnormality\par ABNORMAL ECG

## 2022-08-19 RX ORDER — ASPIRIN 81 MG
81 TABLET, DELAYED RELEASE (ENTERIC COATED) ORAL DAILY
Refills: 0 | Status: ACTIVE | COMMUNITY

## 2022-09-14 ENCOUNTER — APPOINTMENT (OUTPATIENT)
Dept: ORTHOPEDIC SURGERY | Facility: CLINIC | Age: 62
End: 2022-09-14
Payer: COMMERCIAL

## 2022-09-14 VITALS — BODY MASS INDEX: 30.96 KG/M2 | HEIGHT: 61 IN | WEIGHT: 164 LBS

## 2022-09-14 DIAGNOSIS — M17.10 UNILATERAL PRIMARY OSTEOARTHRITIS, UNSPECIFIED KNEE: ICD-10-CM

## 2022-09-14 PROCEDURE — 99214 OFFICE O/P EST MOD 30 MIN: CPT | Mod: 25

## 2022-09-14 PROCEDURE — 99072 ADDL SUPL MATRL&STAF TM PHE: CPT

## 2022-09-14 PROCEDURE — 20611 DRAIN/INJ JOINT/BURSA W/US: CPT

## 2022-09-16 PROBLEM — M17.10 ARTHRITIS OF KNEE: Status: ACTIVE | Noted: 2022-09-16

## 2022-09-20 DIAGNOSIS — M17.0 BILATERAL PRIMARY OSTEOARTHRITIS OF KNEE: ICD-10-CM

## 2022-09-20 RX ORDER — HYALURONATE SODIUM, STABILIZED 88 MG/4 ML
88 SYRINGE (ML) INTRAARTICULAR
Qty: 1 | Refills: 0 | Status: ACTIVE | COMMUNITY
Start: 2022-09-20

## 2022-09-28 ENCOUNTER — APPOINTMENT (OUTPATIENT)
Dept: PSYCHIATRY | Facility: CLINIC | Age: 62
End: 2022-09-28

## 2022-09-28 PROCEDURE — 99072 ADDL SUPL MATRL&STAF TM PHE: CPT

## 2022-09-28 PROCEDURE — 99214 OFFICE O/P EST MOD 30 MIN: CPT

## 2022-09-28 RX ORDER — PREDNISONE 5 MG/1
5 TABLET ORAL
Qty: 90 | Refills: 0 | Status: DISCONTINUED | COMMUNITY
Start: 2022-06-24 | End: 2022-09-28

## 2022-10-02 ENCOUNTER — RX RENEWAL (OUTPATIENT)
Age: 62
End: 2022-10-02

## 2022-10-06 ENCOUNTER — RX RENEWAL (OUTPATIENT)
Age: 62
End: 2022-10-06

## 2023-02-28 ENCOUNTER — APPOINTMENT (OUTPATIENT)
Dept: PSYCHIATRY | Facility: CLINIC | Age: 63
End: 2023-02-28
Payer: COMMERCIAL

## 2023-02-28 PROCEDURE — 99215 OFFICE O/P EST HI 40 MIN: CPT

## 2023-02-28 NOTE — HISTORY OF PRESENT ILLNESS
[FreeTextEntry1] : Patient is a transfer from Dr. Guajardo due to insurance issues. Patient is currently on Risperdal 1mg and Lexapro 10 mg PO QHS and 20 mg PO QAM. Patient states she is doing very well on the current medication regimen. Denies any side effects from the Risperdal or the Lexapro. \par \par Mood is stable. Denies any depression or anxiety. Sleeping: sometimes she has problems in going to sleep. 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  Appetite is good. Energy, concentration, and motivation are all stable.  She states she does not believe  is tampering with her meds or food. \par \par

## 2023-02-28 NOTE — PLAN
[Medication education provided] : Medication education provided. [Rationale for medication choices, possible risks/precautions, benefits, alternative treatment choices, and consequences of non-treatment discussed] : Rationale for medication choices, possible risks/precautions, benefits, alternative treatment choices, and consequences of non-treatment discussed with patient/family/caregiver  [FreeTextEntry5] : Assessment: Patient is a 61 yo female with h/o MDD with psychotic features seen today for medication management. Patient is compliant with the medications, tolerating it well without any side effects. I-STOP was checked without any problems.\par \par PLAN:\par Continue Lexapro 20 mg AM and 10 PO QHS for depression and anxiety\par Continue Risperidone 1 mg at bedtime \par - Discussed risks and benefits of medications including side effects of GI and sexual with SSRI and elebvated prolactin level and tremor with Risperdal.  Alternative strategies including no intervention discussed with patient. Patient consents to current medications as prescribed.\par - Discussed with patient regarding importance of abstinence and sobriety from alcohol and drugs. Educated about relationship between worsening mood/anxiety symptoms and drug use and improvement of symptoms with abstinence. \par - Discussed about unpredictable effects including cardiorespiratory collapse from the combination of illicit drugs and prescribed medications. Patient verbalized understanding.\par - Patient understands to contact clinic prn with concerns and agrees to call 911 or go to nearest ER if symptoms worsen.\par - Next appointment made in 3 month. Patient left the office without any distress.\par \par

## 2023-02-28 NOTE — DISCUSSION/SUMMARY
[FreeTextEntry1] : The patient is a 57 yo,  woman with hx of MDD with psychosis, reported increase in anxiety and depression in Nov, 2017, and Lexapro dose increased to 30 mg/day at that time.\par \par The patient reported mood is stable, not depressed and coping with marital tension, and in recent past few months pain of both knees is affecting her daily functioning. \par \par \par

## 2023-03-12 NOTE — PATIENT PROFILE ADULT - BRAND OF COVID-19 VACCINATION
Patient discharged via AMA. AMA form signed by patient, physician, and nurse. Patient given discharge instructions with GI recommendations. Medication sent to pharmacy. Patient left AMA due to treatment in ED. Patient cooperative but anxious. PIV's removed prior to discharge.     Problem: Adult Inpatient Plan of Care  Goal: Plan of Care Review  Outcome: Met  Goal: Patient-Specific Goal (Individualized)  Outcome: Met  Goal: Absence of Hospital-Acquired Illness or Injury  Outcome: Met  Goal: Optimal Comfort and Wellbeing  Outcome: Met  Goal: Readiness for Transition of Care  Outcome: Met      Pf[employer] dose 1 and 2

## 2023-03-23 NOTE — ED PROVIDER NOTE - INCLUDE COVID-19 DISCHARGE INSTRUCTIONS
bupivacaine (PF) (MARCAINE) 0.5 % injection    PRN Jarod Shafer III, MD   30 mL at 03/23/23 2260       Allergies: Allergies   Allergen Reactions    Meperidine Itching    Sulfa Antibiotics Swelling     Lips & eyes       Problem List:    Patient Active Problem List   Diagnosis Code    Chronic back pain M54.9, G89.29    Ear ringing H93.19    Squamous cell skin cancer C44.92    Skin cancer, basal cell C44.91    Urinary incontinence R32    Other insomnia G47.09    Cramp in limb R25.2    Osteopenia M85.80    Allergic rhinitis J30.9    Scoliosis M41.9    Subclinical hypothyroidism E03.8    Diverticulosis K57.90    Fatigue R53.83    Enlargement of sternoclavicular joint M25.819    Hypertension I10    History of partial thyroidectomy E89.0    Osteoarthritis M19.90    Hemorrhoids K64.9    Carpal tunnel syndrome on right G56.01    Esophageal reflux K21.9    Overweight (BMI 25.0-29. 9) E66.3    Dependent edema R60.9    Macular degeneration H35.30    Unilateral primary osteoarthritis, right knee M17.11    Status post right hip replacement Z96.641    S/P hip replacement, right Z96.641    Hallux rigidus of right foot M20.21       Past Medical History:        Diagnosis Date    Allergic rhinitis 10/21/2015    Carpal tunnel syndrome on right     Chronic back pain 10/21/2015    Diverticulosis 10/21/2015    Ear ringing 10/21/2015    Elevated blood pressure reading without diagnosis of hypertension 10/21/2015    Esophageal reflux 10/21/2015    Fatigue 10/21/2015    Hemorrhoids 10/21/2015    History of adverse effect of anesthesia     states 49 yrs ago had problems with anesthesia injection  during childbirth, States, \" it didn't take\"    History of echocardiogram 10/14/2020    LVEF=55-65%    Macular degeneration 10/21/2015    Osteoarthritis 10/21/2015    Osteopenia 10/21/2015    Sciatica 2020    Skin cancer, basal cell 10/21/2015    Squamous cell skin cancer 10/21/2015    Subclinical
<-------- Click here to INCLUDE CoVID-19 Discharge Instructions

## 2023-03-28 ENCOUNTER — OUTPATIENT (OUTPATIENT)
Dept: OUTPATIENT SERVICES | Facility: HOSPITAL | Age: 63
LOS: 1 days | End: 2023-03-28
Payer: COMMERCIAL

## 2023-03-28 VITALS
RESPIRATION RATE: 16 BRPM | HEART RATE: 68 BPM | SYSTOLIC BLOOD PRESSURE: 104 MMHG | DIASTOLIC BLOOD PRESSURE: 70 MMHG | WEIGHT: 171.96 LBS | TEMPERATURE: 97 F | HEIGHT: 60.63 IN | OXYGEN SATURATION: 98 %

## 2023-03-28 DIAGNOSIS — R68.89 OTHER GENERAL SYMPTOMS AND SIGNS: ICD-10-CM

## 2023-03-28 DIAGNOSIS — M13.871 OTHER SPECIFIED ARTHRITIS, RIGHT ANKLE AND FOOT: ICD-10-CM

## 2023-03-28 DIAGNOSIS — Z98.89 OTHER SPECIFIED POSTPROCEDURAL STATES: Chronic | ICD-10-CM

## 2023-03-28 DIAGNOSIS — F41.9 ANXIETY DISORDER, UNSPECIFIED: ICD-10-CM

## 2023-03-28 DIAGNOSIS — E03.9 HYPOTHYROIDISM, UNSPECIFIED: ICD-10-CM

## 2023-03-28 DIAGNOSIS — M21.611 BUNION OF RIGHT FOOT: ICD-10-CM

## 2023-03-28 DIAGNOSIS — Z86.79 PERSONAL HISTORY OF OTHER DISEASES OF THE CIRCULATORY SYSTEM: ICD-10-CM

## 2023-03-28 PROCEDURE — 93010 ELECTROCARDIOGRAM REPORT: CPT

## 2023-03-28 RX ORDER — ASPIRIN/CALCIUM CARB/MAGNESIUM 324 MG
1 TABLET ORAL
Qty: 0 | Refills: 0 | DISCHARGE

## 2023-03-28 RX ORDER — RISPERIDONE 4 MG/1
1 TABLET ORAL
Qty: 0 | Refills: 0 | DISCHARGE

## 2023-03-28 RX ORDER — MULTIVIT-MIN/FERROUS GLUCONATE 9 MG/15 ML
1 LIQUID (ML) ORAL
Qty: 0 | Refills: 0 | DISCHARGE

## 2023-03-28 RX ORDER — ZINC SULFATE TAB 220 MG (50 MG ZINC EQUIVALENT) 220 (50 ZN) MG
0 TAB ORAL
Qty: 0 | Refills: 0 | DISCHARGE

## 2023-03-28 RX ORDER — LEVOTHYROXINE SODIUM 125 MCG
1 TABLET ORAL
Qty: 0 | Refills: 0 | DISCHARGE

## 2023-03-28 RX ORDER — OMEGA-3 ACID ETHYL ESTERS 1 G
0 CAPSULE ORAL
Qty: 0 | Refills: 0 | DISCHARGE

## 2023-03-28 RX ORDER — ESCITALOPRAM OXALATE 10 MG/1
1 TABLET, FILM COATED ORAL
Qty: 0 | Refills: 0 | DISCHARGE

## 2023-03-28 RX ORDER — PREGABALIN 225 MG/1
0 CAPSULE ORAL
Qty: 0 | Refills: 0 | DISCHARGE

## 2023-03-28 RX ORDER — CHOLECALCIFEROL (VITAMIN D3) 125 MCG
0 CAPSULE ORAL
Qty: 0 | Refills: 0 | DISCHARGE

## 2023-03-28 NOTE — H&P PST ADULT - NSICDXPASTMEDICALHX_GEN_ALL_CORE_FT
PAST MEDICAL HISTORY:  Bunion of right foot     Depression     HLD (hyperlipidemia)     Hypothyroid     Rheumatoid arthritis      PAST MEDICAL HISTORY:  Bunion of right foot     Depression     History of TIAs     HLD (hyperlipidemia)     Hypothyroid     Rheumatoid arthritis

## 2023-03-28 NOTE — H&P PST ADULT - MUSCULOSKELETAL
details… right foot first toe bunion/normal gait/strength 5/5 bilateral upper extremities/strength 5/5 bilateral lower extremities/extremities exam

## 2023-03-28 NOTE — H&P PST ADULT - PROBLEM SELECTOR PLAN 1
Patient tentatively scheduled for  right foot 1st metatarsophalangeal joint fusion Lapidus bunionectomy on 04/07/2023.    Pre-op instructions provided. Pt given verbal and written instructions with teach back on chlorhexidine wash  and pepcid. Pt verbalized understanding with return demonstration.     Labs done.

## 2023-03-28 NOTE — H&P PST ADULT - HISTORY OF PRESENT ILLNESS
62 year old female with pre op dx bunion of right foot is scheduled for right foot 1st metatarsophalangeal joint fusion Lapidus bunionectomy .

## 2023-03-28 NOTE — H&P PST ADULT - PROBLEM SELECTOR PLAN 5
Unable to asses mets - activity intolerance .  Requesting cardiology clearance.  Pt had a scheduled appointment. Unable to asses mets - activity intolerance due to hx of arthritis .  Requesting cardiology clearance and aspirin plan pre op.  Pt verbalized understanding with return demonstration.  Pt had a scheduled appointment.

## 2023-03-28 NOTE — H&P PST ADULT - FUNCTIONAL STATUS
Walks 1 to 2 blocks, climbs 1 flight of stairs, ADLs/less than 4 METS Walks 1 to 2 blocks, climbs 1 flight of stairs, ADLs - activity intolerance due to arthritis/less than 4 METS

## 2023-04-03 ENCOUNTER — APPOINTMENT (OUTPATIENT)
Dept: CARDIOLOGY | Facility: CLINIC | Age: 63
End: 2023-04-03
Payer: COMMERCIAL

## 2023-04-03 VITALS
HEIGHT: 61 IN | OXYGEN SATURATION: 97 % | HEART RATE: 70 BPM | SYSTOLIC BLOOD PRESSURE: 118 MMHG | BODY MASS INDEX: 32.19 KG/M2 | TEMPERATURE: 97.4 F | WEIGHT: 170.5 LBS | DIASTOLIC BLOOD PRESSURE: 74 MMHG

## 2023-04-03 DIAGNOSIS — E78.5 HYPERLIPIDEMIA, UNSPECIFIED: ICD-10-CM

## 2023-04-03 DIAGNOSIS — R73.03 PREDIABETES.: ICD-10-CM

## 2023-04-03 DIAGNOSIS — R06.02 SHORTNESS OF BREATH: ICD-10-CM

## 2023-04-03 DIAGNOSIS — Z01.810 ENCOUNTER FOR PREPROCEDURAL CARDIOVASCULAR EXAMINATION: ICD-10-CM

## 2023-04-03 DIAGNOSIS — F32.3 MAJOR DEPRESSIVE DISORDER, SINGLE EPISODE, SEVERE WITH PSYCHOTIC FEATURES: ICD-10-CM

## 2023-04-03 PROCEDURE — 99213 OFFICE O/P EST LOW 20 MIN: CPT

## 2023-04-03 NOTE — HISTORY OF PRESENT ILLNESS
[FreeTextEntry1] : This is an outpatient f/u visit for Ms. Kemp, a 63 yo woman.\par \par No known h/o heart disease. She presented to the ED c/o chest pressure and SOB x 2 weeks (June 2021). ACS  ruled out with neg trop x2, PE ruled on CTA - pulmonary edema and pleural effusions, echo noted with stage one diastolic dysfunction - treated with IV  lasix and transitioned to PO lasix upon discharge. \par \par Echo 6/3/21:\par Normal valves\par LVEF 72%; mild diastolic dysfunction\par Left pleural effusion\par \par Stress nuclear: 6/3/21:\par Exercised 6'30" to 8 METS\par 88% MPHR\par Peak ; peak /84 mmHg\par no chest pain or EKG changes\par small, mild defect in distal anterior wall that corrects with prone imaging; normal regional wall motion; likely breast attenuation artifact\par \par Possible seroneg RA with arthralgias and deformity. She remains on 5 mg prednisone daily. Hypothyroidism. Depression. HLD. Never smoker. \par \par In the past, she had improved with Lasix. It was felt that her volume overload was likely secondary to steroids and HFpEF. Suggested continuation of low dose Lasix. CT chest: small bilateral pleural effusions with atelectasis. Mildly enlarged heart; no pericardial effusion. PFT's: restrictive dysfunction; decreased lung volumes. Decreased DLCO. \par \par Nursing supervisor; [employer]\par \par Labs:\par BNP, trop normal\par DDimer 800\par chol 167, trig 169, HDL 44, LDL 88 mg/dL\par \par Labs 2022:\par K 4.5, LFT's normal\par BUN 29, creat 0.91\par ESR 19; platelet ct 559k; HCT 32%\par TSH normal\par A1c 9.3%\par \par At her last visit with me in August 2022, the following issues were discussed:\par Hyperlipemia (272.4) (E78.5)\par  · Presently on fibrates. Given her DM and inflammatory arthritis, I switched her from fibrate\par     to statin therapy.\par Prediabetes (790.29) (R73.03)\par  · Markedly elevated A1c; treated by PCP.\par Shortness of breath (786.05) (R06.02)\par  · Presented with SOB due to volume overload in the setting (pleural effusion) last June\par     (2021). Echo c/w diastolic dysfunction. Improved with Lasix. Etiology of volume overload\par     perhaps due to PO steroids (in setting of HFpEF) as well as a\par     chronic inflammatory state (arthralgias, elevated ESR). Doing well on Lasix 40 mg/day.\par     She will continue to f/u with local PCP. She should check her daily weights,\par     and of course, limit salt and free water intake. No further CV testing is indicated at\par     this point.\par Diastolic congestive heart failure (428.30,428.0) (I50.30)\par \par Since her last visit with me, she has been stable. She is planning to undergo bunion surgery in near future. Fluid status has been stable. No new CV complaints. No chest pain. Mild GONZALEZ/unchanged. Remains on Lasix 40 mg daily. \par \par Labs 3/2023:\par CMP normal\par tchol 156, HDL 70, trig 98, LDL 68\par TSH normal\par

## 2023-04-06 ENCOUNTER — TRANSCRIPTION ENCOUNTER (OUTPATIENT)
Age: 63
End: 2023-04-06

## 2023-04-06 VITALS
HEART RATE: 73 BPM | OXYGEN SATURATION: 98 % | TEMPERATURE: 98 F | RESPIRATION RATE: 16 BRPM | HEIGHT: 60.63 IN | SYSTOLIC BLOOD PRESSURE: 162 MMHG | DIASTOLIC BLOOD PRESSURE: 87 MMHG | WEIGHT: 171.96 LBS

## 2023-04-07 ENCOUNTER — OUTPATIENT (OUTPATIENT)
Dept: OUTPATIENT SERVICES | Facility: HOSPITAL | Age: 63
LOS: 1 days | Discharge: HOME | End: 2023-04-07
Payer: COMMERCIAL

## 2023-04-07 ENCOUNTER — RESULT REVIEW (OUTPATIENT)
Age: 63
End: 2023-04-07

## 2023-04-07 ENCOUNTER — TRANSCRIPTION ENCOUNTER (OUTPATIENT)
Age: 63
End: 2023-04-07

## 2023-04-07 VITALS
TEMPERATURE: 98 F | OXYGEN SATURATION: 99 % | DIASTOLIC BLOOD PRESSURE: 71 MMHG | RESPIRATION RATE: 18 BRPM | SYSTOLIC BLOOD PRESSURE: 144 MMHG | HEART RATE: 68 BPM

## 2023-04-07 DIAGNOSIS — Z98.89 OTHER SPECIFIED POSTPROCEDURAL STATES: Chronic | ICD-10-CM

## 2023-04-07 DIAGNOSIS — M13.871 OTHER SPECIFIED ARTHRITIS, RIGHT ANKLE AND FOOT: ICD-10-CM

## 2023-04-07 LAB — GLUCOSE BLDC GLUCOMTR-MCNC: 113 MG/DL — HIGH (ref 70–99)

## 2023-04-07 PROCEDURE — 73630 X-RAY EXAM OF FOOT: CPT | Mod: 26,RT

## 2023-04-07 DEVICE — IMPLANTABLE DEVICE: Type: IMPLANTABLE DEVICE | Status: FUNCTIONAL

## 2023-04-07 DEVICE — K-WIRE WRIGHT MEDICAL (SINGLE TROCAR) BLUNT 1.4MM X 150MM: Type: IMPLANTABLE DEVICE | Status: FUNCTIONAL

## 2023-04-07 RX ORDER — FUROSEMIDE 40 MG
1 TABLET ORAL
Refills: 0 | DISCHARGE

## 2023-04-07 RX ORDER — LEVOTHYROXINE SODIUM 125 MCG
1 TABLET ORAL
Refills: 0 | DISCHARGE

## 2023-04-07 RX ORDER — ASPIRIN/CALCIUM CARB/MAGNESIUM 324 MG
1 TABLET ORAL
Refills: 0 | DISCHARGE

## 2023-04-07 RX ORDER — HYDROXYCHLOROQUINE SULFATE 200 MG
1 TABLET ORAL
Refills: 0 | DISCHARGE

## 2023-04-07 RX ORDER — ESCITALOPRAM OXALATE 10 MG/1
1 TABLET, FILM COATED ORAL
Refills: 0 | DISCHARGE

## 2023-04-07 RX ORDER — ROSUVASTATIN CALCIUM 5 MG/1
1 TABLET ORAL
Refills: 0 | DISCHARGE

## 2023-04-07 RX ORDER — RISPERIDONE 4 MG/1
1 TABLET ORAL
Refills: 0 | DISCHARGE

## 2023-04-07 RX ORDER — POTASSIUM CHLORIDE 20 MEQ
1 PACKET (EA) ORAL
Refills: 0 | DISCHARGE

## 2023-04-07 NOTE — PACU DISCHARGE NOTE - PAIN:
49 year old male with PMHx HLD, RA, NIDDM, presenting for evaluation s/p seizure episode last night. Patient states was tying a bag of laundry when his right arm suddenly became stiff, so he called his wife for help, shortly after his right leg became stiff, then he had a 15 minute tonic clonic episode witnessed by his wife. She held onto him and lowered him to the ground. Reports brief post ictal state last a few minutes. (+) tongue biting. He was here for vEEG in March 2022. Did not need AED. He has not seen Dr. Morejon in the last 6 months. Pt was advised not to drive until seizure free for 1 year in accordance with [place] law    # GTC Sz  vEEG  Accucheck during episode unknown  CTH non acute  Neuro Dr Weiner consulted  U tox STAT  TSH, CPK WNL    # Uncontrolled DM2  Lantus, Premeal and ISS insulin    # Mild Hyponatremia  sec to Sz activity vs Pseudo hyponatremia sec to hyperglycemia  Correct Glucose  Monitor Na    # Mild Prerenal azotemia  post Sz + hyperglycemia  IVF    # Uncontrolled HLD  Start Statins    # RA maintained on Etanercept    Lovenox  Controlled with current regime 49 year old male with PMHx HLD, RA, NIDDM, presenting for evaluation s/p seizure episode last night. Patient states was tying a bag of laundry when his right arm suddenly became stiff, so he called his wife for help, shortly after his right leg became stiff, then he had a 15 minute tonic clonic episode witnessed by his wife. She held onto him and lowered him to the ground. Reports brief post ictal state last a few minutes. (+) tongue biting. He was here for vEEG in March 2022. Did not need AED. He has not seen Dr. Morejon in the last 6 months. Pt was advised not to drive until seizure free for 1 year in accordance with [place] law    # GTC Sz  vEEG  Accucheck during episode unknown  CTH non acute  Neuro Dr Weiner consulted  U tox STAT  TSH, CPK WNL    # Uncontrolled DM2  Lantus, Premeal and ISS insulin    # Mild Hyponatremia  sec to Sz activity vs Pseudo hyponatremia sec to hyperglycemia  Correct Glucose  Monitor Na    # Mild Prerenal azotemia  post Sz + hyperglycemia  IVF    # Uncontrolled HLD  Start Statins    # RA maintained on Etanercept    Lovenox     ADDENDUM:  D/W Neuro. Prior MRI and CTH reviewed and compared. Chronic Lacunar infarct and calcification focus. Plan- currently no AEDs unless EEG positive. Dr Morejon to follow from Monday. ASA added to regimen.

## 2023-05-24 PROBLEM — E78.5 HYPERLIPIDEMIA, UNSPECIFIED: Chronic | Status: ACTIVE | Noted: 2023-03-28

## 2023-05-24 PROBLEM — M21.611 BUNION OF RIGHT FOOT: Chronic | Status: ACTIVE | Noted: 2023-03-28

## 2023-05-24 PROBLEM — M06.9 RHEUMATOID ARTHRITIS, UNSPECIFIED: Chronic | Status: ACTIVE | Noted: 2023-03-28

## 2023-05-24 PROBLEM — Z86.73 PERSONAL HISTORY OF TRANSIENT ISCHEMIC ATTACK (TIA), AND CEREBRAL INFARCTION WITHOUT RESIDUAL DEFICITS: Chronic | Status: ACTIVE | Noted: 2023-03-28

## 2023-06-16 ENCOUNTER — APPOINTMENT (OUTPATIENT)
Dept: PSYCHIATRY | Facility: CLINIC | Age: 63
End: 2023-06-16
Payer: COMMERCIAL

## 2023-06-16 PROCEDURE — 99214 OFFICE O/P EST MOD 30 MIN: CPT

## 2023-06-16 NOTE — HISTORY OF PRESENT ILLNESS
[FreeTextEntry1] : \par Patient is here in the office for face to face interview with writer for 3 month follow-up visit.\par \par \par Patient is currently on Risperdal 1mg and Lexapro 10 mg PO QHS and 20 mg PO QAM. Patient states she is doing very well on the current medication regimen. Denies any side effects from the Risperdal or the Lexapro. \par \par Mood is stable. Denies any depression or anxiety. Sleepin-8 hours. 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  Appetite is good. Energy, concentration, and motivation are all stable.  She states she does not believe  is tampering with her meds or food. \par \par

## 2023-06-16 NOTE — DISCUSSION/SUMMARY
[FreeTextEntry1] : The patient is a 59 yo,  woman with hx of MDD with psychosis, reported increase in anxiety and depression in Nov, 2017, and Lexapro dose increased to 30 mg/day at that time.\par \par The patient reported mood is stable, not depressed and coping with marital tension, and in recent past few months pain of both knees is affecting her daily functioning. \par \par \par

## 2023-06-16 NOTE — PLAN
[Medication education provided] : Medication education provided. [Rationale for medication choices, possible risks/precautions, benefits, alternative treatment choices, and consequences of non-treatment discussed] : Rationale for medication choices, possible risks/precautions, benefits, alternative treatment choices, and consequences of non-treatment discussed with patient/family/caregiver  [FreeTextEntry5] : Assessment: Patient is a 61 yo female with h/o MDD with psychotic features seen today for medication management. Patient is compliant with the medications, tolerating it well without any side effects. I-STOP was checked without any problems.\par \par PLAN:\par Continue Lexapro 20 mg AM and 10 PO QHS for depression and anxiety\par Continue Risperidone 1 mg at bedtime \par - Discussed risks and benefits of medications including side effects of GI and sexual with SSRI and elebvated prolactin level and tremor with Risperdal.  Alternative strategies including no intervention discussed with patient. Patient consents to current medications as prescribed.\par - Discussed with patient regarding importance of abstinence and sobriety from alcohol and drugs. Educated about relationship between worsening mood/anxiety symptoms and drug use and improvement of symptoms with abstinence. \par - Discussed about unpredictable effects including cardiorespiratory collapse from the combination of illicit drugs and prescribed medications. Patient verbalized understanding.\par - Patient understands to contact clinic prn with concerns and agrees to call 911 or go to nearest ER if symptoms worsen.\par - Next appointment made in 6 month. Patient left the office without any distress.\par \par

## 2023-10-30 ENCOUNTER — APPOINTMENT (OUTPATIENT)
Dept: PSYCHIATRY | Facility: CLINIC | Age: 63
End: 2023-10-30
Payer: COMMERCIAL

## 2023-10-30 PROCEDURE — 99214 OFFICE O/P EST MOD 30 MIN: CPT

## 2024-03-11 NOTE — ED ADULT NURSE NOTE - CAS DISCH ACCOMP BY
The patient is Watcher - Medium risk of patient condition declining or worsening    Shift Goals  Clinical Goals: Improving labs, decreased N/V, improving PO intake  Patient Goals: Rest  Family Goals: Update on POC    Progress made toward(s) clinical / shift goals:  Progressing    Problem: Provide Safe Environment  Goal: Suicide environmental safety, protocols, policies, and practices will be implemented  Outcome: Progressing  Flowsheets (Taken 3/11/2024 0800 by Marcela Hayes, R.N.)  Safety Interventions:   Patient Room Close to Nursing Station   Patient Wearing Hospital Clothing   Personal Clothing / Belongings Removed (Comment: Storage Location)   Potentially Dangerous Items Removed from room   Plastic Utensils / Paper Ware Ordered   Provided Safety Education   Discussed no self harm or elopement and safe behavior with patient   Medically necessary equipment present, hourly room safety check, and post-meal tray check.     Problem: Psychosocial  Goal: Patient's ability to identify and develop effective coping behaviors will improve  Outcome: Progressing  Note: Psych consulted on the patient today and is recommending inpatient psych when medically cleared, pt educated on coping techniques when having SI by MD and RN.  Goal: Patient's ability to identify and utilize available support systems will improve  Outcome: Progressing          Self/ coming to pick her up

## 2024-03-25 ENCOUNTER — APPOINTMENT (OUTPATIENT)
Dept: PSYCHIATRY | Facility: CLINIC | Age: 64
End: 2024-03-25
Payer: COMMERCIAL

## 2024-03-25 PROCEDURE — 99214 OFFICE O/P EST MOD 30 MIN: CPT

## 2024-03-25 RX ORDER — ESCITALOPRAM OXALATE 10 MG/1
10 TABLET ORAL
Qty: 90 | Refills: 1 | Status: COMPLETED | COMMUNITY
Start: 2021-12-29 | End: 2024-03-25

## 2024-03-25 NOTE — HISTORY OF PRESENT ILLNESS
[FreeTextEntry1] : Patient is here in the office for face to face interview with writer for 6 month follow-up visit.   Patient is currently on Risperdal 1mg and Lexapro 10 mg PO QHS and 20 mg PO QAM. Patient states she is doing very well on the current medication regimen. Denies any side effects from the Risperdal or the Lexapro. Has pain in her knees due to OA.   Mood is stable. Denies any depression or anxiety. Sleepin-8 hours. 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  Appetite is good. Energy, concentration, and motivation is all stable.  She states she does not believe  is tampering with her meds or food. Denies any AVH, SI or HI

## 2024-03-25 NOTE — PLAN
[Medication education provided] : Medication education provided. [FreeTextEntry5] : Assessment: Patient is a 63 yo female with h/o MDD with psychotic features seen today for medication management. Patient is compliant with the medications, tolerating it well without any side effects. I-STOP was checked without any problems.  PLAN: Continue Lexapro 20 mg AM and 10 PO QHS for depression and anxiety Continue Risperidone 1 mg at bedtime  - Discussed risks and benefits of medications including side effects of GI and sexual with SSRI and elebvated prolactin level and tremor with Risperdal.  Alternative strategies including no intervention discussed with patient. Patient consents to current medications as prescribed. - Discussed with patient regarding importance of abstinence and sobriety from alcohol and drugs. Educated about relationship between worsening mood/anxiety symptoms and drug use and improvement of symptoms with abstinence.  - Discussed about unpredictable effects including cardiorespiratory collapse from the combination of illicit drugs and prescribed medications. Patient verbalized understanding. - Patient understands to contact clinic prn with concerns and agrees to call 911 or go to nearest ER if symptoms worsen. - Next appointment made in 6 month. Patient left the office without any distress.   [Rationale for medication choices, possible risks/precautions, benefits, alternative treatment choices, and consequences of non-treatment discussed] : Rationale for medication choices, possible risks/precautions, benefits, alternative treatment choices, and consequences of non-treatment discussed with patient/family/caregiver

## 2024-05-01 RX ORDER — ESCITALOPRAM OXALATE 10 MG/1
10 TABLET ORAL
Qty: 90 | Refills: 0 | Status: ACTIVE | COMMUNITY
Start: 2024-05-01 | End: 1900-01-01

## 2024-10-15 ENCOUNTER — APPOINTMENT (OUTPATIENT)
Dept: PSYCHIATRY | Facility: CLINIC | Age: 64
End: 2024-10-15
Payer: COMMERCIAL

## 2024-10-15 PROCEDURE — 99214 OFFICE O/P EST MOD 30 MIN: CPT

## 2024-10-15 RX ORDER — RISPERIDONE 0.5 MG/1
0.5 TABLET, FILM COATED ORAL
Qty: 30 | Refills: 1 | Status: ACTIVE | COMMUNITY
Start: 2024-10-15 | End: 1900-01-01

## 2024-12-09 ENCOUNTER — APPOINTMENT (OUTPATIENT)
Dept: PSYCHIATRY | Facility: CLINIC | Age: 64
End: 2024-12-09
Payer: COMMERCIAL

## 2024-12-09 PROCEDURE — 99214 OFFICE O/P EST MOD 30 MIN: CPT

## 2025-02-04 ENCOUNTER — APPOINTMENT (OUTPATIENT)
Dept: PSYCHIATRY | Facility: CLINIC | Age: 65
End: 2025-02-04

## 2025-03-04 ENCOUNTER — APPOINTMENT (OUTPATIENT)
Dept: PSYCHIATRY | Facility: CLINIC | Age: 65
End: 2025-03-04
Payer: COMMERCIAL

## 2025-03-04 PROCEDURE — 99214 OFFICE O/P EST MOD 30 MIN: CPT | Mod: 95

## 2025-05-14 ENCOUNTER — APPOINTMENT (OUTPATIENT)
Dept: PULMONOLOGY | Facility: CLINIC | Age: 65
End: 2025-05-14
Payer: COMMERCIAL

## 2025-05-14 VITALS
HEART RATE: 73 BPM | HEIGHT: 61 IN | BODY MASS INDEX: 33.99 KG/M2 | OXYGEN SATURATION: 92 % | SYSTOLIC BLOOD PRESSURE: 108 MMHG | DIASTOLIC BLOOD PRESSURE: 69 MMHG | WEIGHT: 180 LBS

## 2025-05-14 VITALS — OXYGEN SATURATION: 95 %

## 2025-05-14 DIAGNOSIS — M05.9 RHEUMATOID ARTHRITIS WITH RHEUMATOID FACTOR, UNSPECIFIED: ICD-10-CM

## 2025-05-14 DIAGNOSIS — R06.02 SHORTNESS OF BREATH: ICD-10-CM

## 2025-05-14 DIAGNOSIS — M06.9 RHEUMATOID ARTHRITIS, UNSPECIFIED: ICD-10-CM

## 2025-05-14 DIAGNOSIS — R06.83 SNORING: ICD-10-CM

## 2025-05-14 LAB — POCT - HEMOGLOBIN (HGB), QUANTITATIVE, TRANSCUTANEOUS: 11.2

## 2025-05-14 PROCEDURE — 94727 GAS DIL/WSHOT DETER LNG VOL: CPT

## 2025-05-14 PROCEDURE — 94729 DIFFUSING CAPACITY: CPT

## 2025-05-14 PROCEDURE — 99214 OFFICE O/P EST MOD 30 MIN: CPT | Mod: 25

## 2025-05-14 PROCEDURE — 94010 BREATHING CAPACITY TEST: CPT

## 2025-05-14 PROCEDURE — ZZZZZ: CPT

## 2025-05-14 PROCEDURE — 88738 HGB QUANT TRANSCUTANEOUS: CPT

## 2025-05-23 ENCOUNTER — OUTPATIENT (OUTPATIENT)
Dept: OUTPATIENT SERVICES | Facility: HOSPITAL | Age: 65
LOS: 1 days | End: 2025-05-23
Payer: COMMERCIAL

## 2025-05-23 ENCOUNTER — APPOINTMENT (OUTPATIENT)
Dept: CT IMAGING | Facility: IMAGING CENTER | Age: 65
End: 2025-05-23

## 2025-05-23 DIAGNOSIS — M06.9 RHEUMATOID ARTHRITIS, UNSPECIFIED: ICD-10-CM

## 2025-05-23 DIAGNOSIS — M05.9 RHEUMATOID ARTHRITIS WITH RHEUMATOID FACTOR, UNSPECIFIED: ICD-10-CM

## 2025-05-23 DIAGNOSIS — Z00.8 ENCOUNTER FOR OTHER GENERAL EXAMINATION: ICD-10-CM

## 2025-05-23 DIAGNOSIS — Z98.89 OTHER SPECIFIED POSTPROCEDURAL STATES: Chronic | ICD-10-CM

## 2025-05-23 PROCEDURE — 71250 CT THORAX DX C-: CPT

## 2025-05-23 PROCEDURE — 71250 CT THORAX DX C-: CPT | Mod: 26

## 2025-08-29 ENCOUNTER — APPOINTMENT (OUTPATIENT)
Dept: PSYCHIATRY | Facility: CLINIC | Age: 65
End: 2025-08-29

## 2025-09-08 ENCOUNTER — NON-APPOINTMENT (OUTPATIENT)
Age: 65
End: 2025-09-08

## 2025-09-08 ENCOUNTER — APPOINTMENT (OUTPATIENT)
Dept: CARDIOLOGY | Facility: CLINIC | Age: 65
End: 2025-09-08
Payer: COMMERCIAL

## 2025-09-08 VITALS
TEMPERATURE: 97.3 F | SYSTOLIC BLOOD PRESSURE: 104 MMHG | OXYGEN SATURATION: 96 % | WEIGHT: 183 LBS | HEART RATE: 88 BPM | RESPIRATION RATE: 16 BRPM | HEIGHT: 61 IN | BODY MASS INDEX: 34.55 KG/M2 | DIASTOLIC BLOOD PRESSURE: 69 MMHG

## 2025-09-08 DIAGNOSIS — R73.03 PREDIABETES.: ICD-10-CM

## 2025-09-08 DIAGNOSIS — M05.9 RHEUMATOID ARTHRITIS WITH RHEUMATOID FACTOR, UNSPECIFIED: ICD-10-CM

## 2025-09-08 DIAGNOSIS — E78.5 HYPERLIPIDEMIA, UNSPECIFIED: ICD-10-CM

## 2025-09-08 DIAGNOSIS — R06.02 SHORTNESS OF BREATH: ICD-10-CM

## 2025-09-08 PROCEDURE — 93000 ELECTROCARDIOGRAM COMPLETE: CPT

## 2025-09-08 PROCEDURE — 99214 OFFICE O/P EST MOD 30 MIN: CPT

## 2025-09-08 PROCEDURE — G2211 COMPLEX E/M VISIT ADD ON: CPT | Mod: NC

## 2025-09-08 RX ORDER — PREDNISONE 2.5 MG/1
2.5 TABLET ORAL
Refills: 0 | Status: ACTIVE | COMMUNITY
Start: 2025-09-08

## 2025-09-08 RX ORDER — HYDROXYCHLOROQUINE SULFATE 200 MG/1
200 TABLET, FILM COATED ORAL DAILY
Refills: 0 | Status: ACTIVE | COMMUNITY
Start: 2025-09-08

## (undated) DEVICE — PREP CHLORAPREP HI-LITE ORANGE 26ML

## (undated) DEVICE — VENODYNE/SCD SLEEVE CALF BARIATRIC

## (undated) DEVICE — DRSG STERISTRIPS 0.25 X 3"

## (undated) DEVICE — DRILL BIT WRIGHT MEDICAL 3X60MM

## (undated) DEVICE — LABELS BLANK W PEN

## (undated) DEVICE — TOURNIQUET CUFF 18" DUAL PORT SINGLE BLADDER LUER LOCK (BLACK)

## (undated) DEVICE — TUBING IRRIGATION 400MM

## (undated) DEVICE — SUT ETHILON 4-0 18" PS-2

## (undated) DEVICE — NDL HYPO SAFE 18G X 1.5" (PINK)

## (undated) DEVICE — TOURNIQUET ESMARK 4"

## (undated) DEVICE — DRAPE C ARM MINI

## (undated) DEVICE — SYR LUER LOK 10CC

## (undated) DEVICE — DRILL BIT WRIGHT MEDICAL 2.2MM

## (undated) DEVICE — VENODYNE/SCD SLEEVE CALF LARGE

## (undated) DEVICE — ELCTR GROUNDING PAD ADULT COVIDIEN

## (undated) DEVICE — SAW BLADE MICROAIRE SAGITTAL 9.4MMX25.4MMX0.6MM

## (undated) DEVICE — NDL HYPO REGULAR BEVEL 25G X 1.5" (BLUE)

## (undated) DEVICE — GLV 7.5 PROTEXIS (WHITE)

## (undated) DEVICE — GAUGE DEPT MICA 150MM

## (undated) DEVICE — SOL IRR POUR NS 0.9% 500ML

## (undated) DEVICE — DRIVER HEX 2.5MM DISP

## (undated) DEVICE — WARMING BLANKET UPPER ADULT

## (undated) DEVICE — TRANSLATOR MICA FIRST META

## (undated) DEVICE — DRSG MASTISOL

## (undated) DEVICE — BLADE SURGICAL #15 CARBON

## (undated) DEVICE — DRSG STOCKINETTE TUBULAR 6"

## (undated) DEVICE — DRSG CURITY GAUZE SPONGE 4 X 4" 12-PLY

## (undated) DEVICE — POSITIONER FOAM EGG CRATE ULNAR 2PCS (PINK)

## (undated) DEVICE — DRSG ACE BANDAGE 4" NS

## (undated) DEVICE — PACK LIJ BASIC ORTHO

## (undated) DEVICE — FRAZIER SUCTION TIP 8FR

## (undated) DEVICE — Device

## (undated) DEVICE — SUT VICRYL 3-0 18" PS-2 UNDYED

## (undated) DEVICE — DRSG KLING 4"

## (undated) DEVICE — VENODYNE/SCD SLEEVE CALF MEDIUM

## (undated) DEVICE — BUR WEDGE MICA 3.1X13MM

## (undated) DEVICE — PACK MIS W BLADE PROSTEP STRL

## (undated) DEVICE — POSITIONER PATIENT SAFETY STRAP 3X60"